# Patient Record
Sex: FEMALE | Race: WHITE | NOT HISPANIC OR LATINO | URBAN - METROPOLITAN AREA
[De-identification: names, ages, dates, MRNs, and addresses within clinical notes are randomized per-mention and may not be internally consistent; named-entity substitution may affect disease eponyms.]

---

## 2023-05-12 LAB — GROUP A STREP, PCR: NOT DETECTED

## 2023-09-01 PROBLEM — F41.9 ANXIETY: Status: ACTIVE | Noted: 2023-09-01

## 2023-09-01 PROBLEM — R92.1 BREAST CALCIFICATION, LEFT: Status: ACTIVE | Noted: 2023-09-01

## 2023-09-01 PROBLEM — K21.9 GERD (GASTROESOPHAGEAL REFLUX DISEASE): Status: ACTIVE | Noted: 2023-09-01

## 2023-09-01 PROBLEM — F51.01 PRIMARY INSOMNIA: Status: ACTIVE | Noted: 2023-09-01

## 2023-09-01 RX ORDER — MULTIVITAMIN
1 TABLET ORAL DAILY
COMMUNITY

## 2023-09-01 ASSESSMENT — ENCOUNTER SYMPTOMS
BACK PAIN: 0
EYE REDNESS: 0
EYE PAIN: 0
SHORTNESS OF BREATH: 0
SORE THROAT: 0
BLOOD IN STOOL: 0
NECK PAIN: 0
DIZZINESS: 0
DYSURIA: 0
HEADACHES: 0
ADENOPATHY: 0
CHILLS: 0
UNEXPECTED WEIGHT CHANGE: 0
ABDOMINAL PAIN: 0
APPETITE CHANGE: 0
HALLUCINATIONS: 0
VOMITING: 0
EYE DISCHARGE: 0
CONFUSION: 0
BRUISES/BLEEDS EASILY: 0
FEVER: 0
TROUBLE SWALLOWING: 0
PALPITATIONS: 0
COUGH: 0
POLYPHAGIA: 0
FREQUENCY: 0
FATIGUE: 0
POLYDIPSIA: 0
WOUND: 0
HEMATURIA: 0

## 2023-09-01 NOTE — PROGRESS NOTES
Subjective   Patient ID: Kaylah Parham is a 43 y.o. female who presents for No chief complaint on file..  Is pt fasting? ***  Does pt see any providers other than care team below: ***  staci Vergara, paige to [facility]    Does pt want flu shot?  When did dr to say your next mammogram is due?    No care team member to display    HPI  Last labs-11/4/22 cmp nl, ldl 126, tsh nl, cbc nl  Due for labs- all    Cholesterol   Date Value Ref Range Status   11/11/2021 163 0 - 199 mg/dL Final     Comment:     .      AGE      DESIRABLE   BORDERLINE HIGH   HIGH     0-19 Y     0 - 169       170 - 199     >/= 200    20-24 Y     0 - 189       190 - 224     >/= 225         >24 Y     0 - 199       200 - 239     >/= 240   **All ranges are based on fasting samples. Specific   therapeutic targets will vary based on patient-specific   cardiac risk.  .   Pediatric guidelines reference:Pediatrics 2011, 128(S5).   Adult guidelines reference: NCEP ATPIII Guidelines,     SAAMNTHA 2001, 258:2486-97  .   Venipuncture immediately after or during the    administration of Metamizole may lead to falsely   low results. Testing should be performed immediately   prior to Metamizole dosing.       Triglycerides   Date Value Ref Range Status   11/11/2021 46 0 - 149 mg/dL Final     Comment:     .      AGE      DESIRABLE   BORDERLINE HIGH   HIGH     VERY HIGH   0 D-90 D    19 - 174         ----         ----        ----  91 D- 9 Y     0 -  74        75 -  99     >/= 100      ----    10-19 Y     0 -  89        90 - 129     >/= 130      ----    20-24 Y     0 - 114       115 - 149     >/= 150      ----         >24 Y     0 - 149       150 - 199    200- 499    >/= 500  .   Venipuncture immediately after or during the    administration of Metamizole may lead to falsely   low results. Testing should be performed immediately   prior to Metamizole dosing.       HDL   Date Value Ref Range Status   11/11/2021 50.9 mg/dL Final     Comment:     .      AGE      VERY  "LOW   LOW     NORMAL    HIGH       0-19 Y       < 35   < 40     40-45     ----    20-24 Y       ----   < 40       >45     ----      >24 Y       ----   < 40     40-60      >60  .       No results found for: \"LDL\"  TSH   Date Value Ref Range Status   11/11/2021 1.54 0.44 - 3.98 mIU/L Final     Comment:      TSH testing is performed using different testing    methodology at [facility] than at other    [facility] hospitals. Direct result comparisons should    only be made within the same method.       No results found for: \"A1C\"  No components found for: \"POCA1C\"  No results found for: \"ALBUR\"  No components found for: \"POCALBUR\"      Other concerns:    bps at home- ***    ER/urgicare visits in the last year- ***  Hospitalizations in the last year- ***      last Pap-3/4/23; due 3/2028  H/o abn pap-     ovarian, endometrial, cervical, uterine ca-    Current birth control method-  No change in contraception desired    last mammo- (40-75/90) 11/9/22; next one?  Self breast exams-    FH br ca-    FH colon ca-      Exercise- ***  Diet-***   There is no height or weight on file to calculate BMI.    last eye dr appt- 3/21/23  No vision issues    last dental appt- ***    BMs-regular  Sleep-able to fall asleep and stay asleep; no snoring or apnea  no cp, swelling, sob, abd pain, n/v/d/c, blood in stool or black stools  STI testing including hiv (age 15-65) and hep c screening (18-79)-***        Immunization History   Administered Date(s) Administered   • Tdap vaccine, age 10 years and older (BOOSTRIX) 01/02/2018       fractures in lifetime-***  FH hip/spine/wrist/humerus fx in mom, dad or sibs-    FH heart attack, heart surgery-mat gr gm-mi, mat gr gf-mi  FH stroke-***    The 10-year ASCVD risk score (Ines ANDREWS, et al., 2019) is: 0.5%    Values used to calculate the score:      Age: 43 years      Sex: Female      Is Non- : No      Diabetic: No      Tobacco smoker: No      Systolic Blood " Pressure: 118 mmHg      Is BP treated: No      HDL Cholesterol: 50.9 mg/dL      Total Cholesterol: 163 mg/dL        Review of Systems   Constitutional:  Negative for appetite change, chills, fatigue, fever and unexpected weight change.   HENT:  Negative for congestion, ear pain, sore throat and trouble swallowing.    Eyes:  Negative for pain, discharge and redness.   Respiratory:  Negative for cough and shortness of breath.    Cardiovascular:  Negative for chest pain and palpitations.   Gastrointestinal:  Negative for abdominal pain, blood in stool and vomiting.   Endocrine: Negative for polydipsia, polyphagia and polyuria.   Genitourinary:  Negative for dysuria, frequency, hematuria and urgency.   Musculoskeletal:  Negative for back pain and neck pain.   Skin:  Negative for rash and wound.   Allergic/Immunologic: Negative for immunocompromised state.   Neurological:  Negative for dizziness, syncope and headaches.   Hematological:  Negative for adenopathy. Does not bruise/bleed easily.   Psychiatric/Behavioral:  Negative for confusion and hallucinations.      Objective   There were no vitals taken for this visit.   BP Readings from Last 3 Encounters:   05/11/23 118/73   03/04/23 120/81   02/15/23 110/74     Wt Readings from Last 3 Encounters:   05/11/23 54.4 kg (120 lb)   03/04/23 54.9 kg (121 lb)   02/15/23 54.4 kg (120 lb)           Physical Exam  Constitutional:       General: She is not in acute distress.     Appearance: Normal appearance. She is not ill-appearing.   HENT:      Head: Normocephalic.      Right Ear: Tympanic membrane, ear canal and external ear normal.      Left Ear: Tympanic membrane, ear canal and external ear normal.      Nose: Nose normal.      Mouth/Throat:      Mouth: Mucous membranes are moist.      Pharynx: Oropharynx is clear.   Eyes:      Extraocular Movements: Extraocular movements intact.      Conjunctiva/sclera: Conjunctivae normal.      Pupils: Pupils are equal, round, and reactive  to light.   Cardiovascular:      Rate and Rhythm: Normal rate and regular rhythm.      Heart sounds: Normal heart sounds. No murmur heard.  Pulmonary:      Effort: Pulmonary effort is normal. No respiratory distress.      Breath sounds: Normal breath sounds. No wheezing, rhonchi or rales.   Abdominal:      General: Bowel sounds are normal.      Palpations: Abdomen is soft. There is no mass.      Tenderness: There is no abdominal tenderness.   Musculoskeletal:         General: No swelling or tenderness. Normal range of motion.      Cervical back: Normal range of motion and neck supple.      Right lower leg: No edema.      Left lower leg: No edema.   Skin:     General: Skin is warm.      Findings: No rash.   Neurological:      General: No focal deficit present.      Mental Status: She is alert and oriented to person, place, and time.      Cranial Nerves: No cranial nerve deficit.      Motor: No weakness.   Psychiatric:         Mood and Affect: Mood normal.         Behavior: Behavior normal.     Assessment/Plan   {Assess/PlanSmartLinks:49232}

## 2023-09-05 ENCOUNTER — APPOINTMENT (OUTPATIENT)
Dept: PRIMARY CARE | Facility: CLINIC | Age: 43
End: 2023-09-05
Payer: COMMERCIAL

## 2023-09-22 ASSESSMENT — ENCOUNTER SYMPTOMS
EYE DISCHARGE: 0
ADENOPATHY: 0
FATIGUE: 0
CHILLS: 0
EYE PAIN: 0
EYE REDNESS: 0
VOMITING: 0
APPETITE CHANGE: 0
BLOOD IN STOOL: 0
DIZZINESS: 0
WOUND: 0
DYSURIA: 0
HEADACHES: 0
COUGH: 0
NECK PAIN: 0
FEVER: 0
CONFUSION: 0
HEMATURIA: 0
POLYDIPSIA: 0
SORE THROAT: 0
PALPITATIONS: 0
TROUBLE SWALLOWING: 0
HALLUCINATIONS: 0
UNEXPECTED WEIGHT CHANGE: 0
POLYPHAGIA: 0
BRUISES/BLEEDS EASILY: 0
BACK PAIN: 0
SHORTNESS OF BREATH: 0
ABDOMINAL PAIN: 0
FREQUENCY: 0

## 2023-09-22 NOTE — PROGRESS NOTES
Subjective   Patient ID: Kaylah Parham is a 43 y.o. female who presents for Annual Exam.  Is pt fasting? Yes  Does pt see any providers other than care team below: No  staci Vergara, yousuf, karley gibson    Does pt want flu shot? yes  When did dr to say your next mammogram is due? November 2023    No care team member to display    HPI  Last labs-11/4/22 cmp nl, ldl 126, tsh nl, cbc nl  Due for labs- all    Cholesterol   Date Value Ref Range Status   11/11/2021 163 0 - 199 mg/dL Final     Comment:     .      AGE      DESIRABLE   BORDERLINE HIGH   HIGH     0-19 Y     0 - 169       170 - 199     >/= 200    20-24 Y     0 - 189       190 - 224     >/= 225         >24 Y     0 - 199       200 - 239     >/= 240   **All ranges are based on fasting samples. Specific   therapeutic targets will vary based on patient-specific   cardiac risk.  .   Pediatric guidelines reference:Pediatrics 2011, 128(S5).   Adult guidelines reference: NCEP ATPIII Guidelines,     SAMANTHA 2001, 258:2486-97  .   Venipuncture immediately after or during the    administration of Metamizole may lead to falsely   low results. Testing should be performed immediately   prior to Metamizole dosing.       Triglycerides   Date Value Ref Range Status   11/11/2021 46 0 - 149 mg/dL Final     Comment:     .      AGE      DESIRABLE   BORDERLINE HIGH   HIGH     VERY HIGH   0 D-90 D    19 - 174         ----         ----        ----  91 D- 9 Y     0 -  74        75 -  99     >/= 100      ----    10-19 Y     0 -  89        90 - 129     >/= 130      ----    20-24 Y     0 - 114       115 - 149     >/= 150      ----         >24 Y     0 - 149       150 - 199    200- 499    >/= 500  .   Venipuncture immediately after or during the    administration of Metamizole may lead to falsely   low results. Testing should be performed immediately   prior to Metamizole dosing.       HDL   Date Value Ref Range Status   11/11/2021 50.9 mg/dL Final     Comment:     .      AGE      VERY  "LOW   LOW     NORMAL    HIGH       0-19 Y       < 35   < 40     40-45     ----    20-24 Y       ----   < 40       >45     ----      >24 Y       ----   < 40     40-60      >60  .       No results found for: \"LDL\"  TSH   Date Value Ref Range Status   11/11/2021 1.54 0.44 - 3.98 mIU/L Final     Comment:      TSH testing is performed using different testing    methodology at [facility] than at other    [facility] hospitals. Direct result comparisons should    only be made within the same method.       No results found for: \"A1C\"  No components found for: \"POCA1C\"  No results found for: \"ALBUR\"  No components found for: \"POCALBUR\"      Other concerns: pain issues since may when gardening, mulching, weeding  Thumb swollen, hand painful/numbness-gone now  Rt thumb still numb on off    rt arm pain x 2mon on off; worse end of day    Lf neck pain x x5d    Bili always high    bps at home- none    ER/urgicare visits in the last year- ST  Hospitalizations in the last year- none      last Pap-3/4/23; due 3/2028  H/o abn pap-2 times ago; hpv was normal; last pap both normal    FH ovarian, endometrial, cervical, uterine ca-none    Current birth control method-none  No change in contraception desired    last mammo- (40-75/90) 11/9/22; next one? Due 11/2023      FH br ca-none    FH colon ca-mat gr gm    Exercise- son in 1/2 day ; wants to swim  Diet-2 meals   Body mass index is 21.28 kg/m².    last eye dr appt- 3/21/23  No vision issues    last dental appt- q6mon    BMs-regular with occas constipation  Sleep-able to fall asleep but hard to stay asleep; no snoring or apnea 6-7 hrs  no cp, swelling, sob, abd pain, n/v/d/c, blood in stool or black stools  STI testing including hiv (age 15-65) and hep c screening (18-79)-declines        Immunization History   Administered Date(s) Administered    Moderna SARS-CoV-2 Vaccination 04/21/2021, 05/21/2021, 12/14/2021    Tdap vaccine, age 7 year and older (BOOSTRIX) " 01/02/2018       fractures in lifetime-none    FH heart attack, heart surgery-mat gr gm-mi, mat gr gf-mi; mat gm-valve issues, afib, mi  FH stroke-none    The 10-year ASCVD risk score (Ines ANDREWS, et al., 2019) is: 0.5%    Values used to calculate the score:      Age: 43 years      Sex: Female      Is Non- : No      Diabetic: No      Tobacco smoker: No      Systolic Blood Pressure: 115 mmHg      Is BP treated: No      HDL Cholesterol: 50.9 mg/dL      Total Cholesterol: 163 mg/dL        Review of Systems   Constitutional:  Negative for appetite change, chills, fatigue, fever and unexpected weight change.   HENT:  Negative for congestion, ear pain, sore throat and trouble swallowing.    Eyes:  Negative for pain, discharge and redness.   Respiratory:  Negative for cough and shortness of breath.    Cardiovascular:  Negative for chest pain and palpitations.   Gastrointestinal:  Negative for abdominal pain, blood in stool and vomiting.   Endocrine: Negative for polydipsia, polyphagia and polyuria.   Genitourinary:  Negative for dysuria, frequency, hematuria and urgency.   Musculoskeletal:  Negative for back pain and neck pain.   Skin:  Negative for rash and wound.   Allergic/Immunologic: Negative for immunocompromised state.   Neurological:  Negative for dizziness, syncope and headaches.   Hematological:  Negative for adenopathy. Does not bruise/bleed easily.   Psychiatric/Behavioral:  Negative for confusion and hallucinations.        Objective   Visit Vitals  /69   Pulse 87      BP Readings from Last 3 Encounters:   09/25/23 115/69   05/11/23 118/73   03/04/23 120/81     Wt Readings from Last 3 Encounters:   09/25/23 56.2 kg (124 lb)   05/11/23 54.4 kg (120 lb)   03/04/23 54.9 kg (121 lb)           Physical Exam  Constitutional:       General: She is not in acute distress.     Appearance: Normal appearance. She is not ill-appearing.   HENT:      Head: Normocephalic.      Right Ear: Tympanic  membrane, ear canal and external ear normal.      Left Ear: Tympanic membrane, ear canal and external ear normal.      Nose: Nose normal.      Mouth/Throat:      Mouth: Mucous membranes are moist.      Pharynx: Oropharynx is clear.   Eyes:      Extraocular Movements: Extraocular movements intact.      Conjunctiva/sclera: Conjunctivae normal.      Pupils: Pupils are equal, round, and reactive to light.   Cardiovascular:      Rate and Rhythm: Normal rate and regular rhythm.      Heart sounds: Normal heart sounds. No murmur heard.  Pulmonary:      Effort: Pulmonary effort is normal. No respiratory distress.      Breath sounds: Normal breath sounds. No wheezing, rhonchi or rales.   Abdominal:      General: Bowel sounds are normal.      Palpations: Abdomen is soft. There is no mass.      Tenderness: There is no abdominal tenderness.   Musculoskeletal:         General: No swelling or tenderness. Normal range of motion.      Cervical back: Normal range of motion and neck supple.      Right lower leg: No edema.      Left lower leg: No edema.   Skin:     General: Skin is warm.      Findings: No rash.   Neurological:      General: No focal deficit present.      Mental Status: She is alert and oriented to person, place, and time.      Cranial Nerves: No cranial nerve deficit.      Motor: No weakness.   Psychiatric:         Mood and Affect: Mood normal.         Behavior: Behavior normal.       Assessment/Plan   Diagnoses and all orders for this visit:  Routine general medical examination at a health care facility  -     Comprehensive Metabolic Panel; Future  -     CBC and Auto Differential; Future  -     Lipid Panel; Future  -     TSH with reflex to Free T4 if abnormal; Future  BMI 21.0-21.9, adult  Myalgia  -     Creatine Kinase; Future  Arthralgia, unspecified joint  -     Rheumatoid Factor; Future  -     Sedimentation Rate; Future  -     VERO with Reflex to CASSANDRA; Future  Numbness and tingling of right thumb  -     predniSONE  (Deltasone) 20 mg tablet; 3 tabs daily x3d then 2 tabs daily x 3d then 1 tab daily x 3d then 1/2 tab daily x 3d with food  -     orphenadrine (Norflex) 100 mg 12 hr tablet; Take 1 tablet (100 mg) by mouth 2 times a day as needed for muscle spasms (or back pain). Do not crush, chew, or split.  Acute pain of right shoulder  -     predniSONE (Deltasone) 20 mg tablet; 3 tabs daily x3d then 2 tabs daily x 3d then 1 tab daily x 3d then 1/2 tab daily x 3d with food  -     orphenadrine (Norflex) 100 mg 12 hr tablet; Take 1 tablet (100 mg) by mouth 2 times a day as needed for muscle spasms (or back pain). Do not crush, chew, or split.  Neck pain  -     predniSONE (Deltasone) 20 mg tablet; 3 tabs daily x3d then 2 tabs daily x 3d then 1 tab daily x 3d then 1/2 tab daily x 3d with food  -     orphenadrine (Norflex) 100 mg 12 hr tablet; Take 1 tablet (100 mg) by mouth 2 times a day as needed for muscle spasms (or back pain). Do not crush, chew, or split.  Other orders  -     Flu vaccine (IIV4) age 6 months and greater, preservative free  -     Follow Up In Primary Care - Health Maintenance; Future

## 2023-09-25 ENCOUNTER — OFFICE VISIT (OUTPATIENT)
Dept: PRIMARY CARE | Facility: CLINIC | Age: 43
End: 2023-09-25
Payer: COMMERCIAL

## 2023-09-25 ENCOUNTER — LAB (OUTPATIENT)
Dept: LAB | Facility: LAB | Age: 43
End: 2023-09-25
Payer: COMMERCIAL

## 2023-09-25 ENCOUNTER — TELEPHONE (OUTPATIENT)
Dept: PRIMARY CARE | Facility: CLINIC | Age: 43
End: 2023-09-25

## 2023-09-25 VITALS
WEIGHT: 124 LBS | BODY MASS INDEX: 21.17 KG/M2 | SYSTOLIC BLOOD PRESSURE: 115 MMHG | HEIGHT: 64 IN | OXYGEN SATURATION: 97 % | DIASTOLIC BLOOD PRESSURE: 69 MMHG | HEART RATE: 87 BPM

## 2023-09-25 DIAGNOSIS — M54.2 NECK PAIN: ICD-10-CM

## 2023-09-25 DIAGNOSIS — M25.50 ARTHRALGIA, UNSPECIFIED JOINT: ICD-10-CM

## 2023-09-25 DIAGNOSIS — Z00.00 ROUTINE GENERAL MEDICAL EXAMINATION AT A HEALTH CARE FACILITY: Primary | ICD-10-CM

## 2023-09-25 DIAGNOSIS — R20.2 NUMBNESS AND TINGLING OF RIGHT THUMB: ICD-10-CM

## 2023-09-25 DIAGNOSIS — M79.10 MYALGIA: ICD-10-CM

## 2023-09-25 DIAGNOSIS — Z00.00 ROUTINE GENERAL MEDICAL EXAMINATION AT A HEALTH CARE FACILITY: ICD-10-CM

## 2023-09-25 DIAGNOSIS — M25.511 ACUTE PAIN OF RIGHT SHOULDER: ICD-10-CM

## 2023-09-25 DIAGNOSIS — R92.1 BREAST CALCIFICATION, LEFT: ICD-10-CM

## 2023-09-25 DIAGNOSIS — R20.0 NUMBNESS AND TINGLING OF RIGHT THUMB: ICD-10-CM

## 2023-09-25 LAB
ALANINE AMINOTRANSFERASE (SGPT) (U/L) IN SER/PLAS: 11 U/L (ref 7–45)
ALBUMIN (G/DL) IN SER/PLAS: 4.4 G/DL (ref 3.4–5)
ALKALINE PHOSPHATASE (U/L) IN SER/PLAS: 52 U/L (ref 33–110)
ANION GAP IN SER/PLAS: 12 MMOL/L (ref 10–20)
ASPARTATE AMINOTRANSFERASE (SGOT) (U/L) IN SER/PLAS: 13 U/L (ref 9–39)
BASOPHILS (10*3/UL) IN BLOOD BY AUTOMATED COUNT: 0.05 X10E9/L (ref 0–0.1)
BASOPHILS/100 LEUKOCYTES IN BLOOD BY AUTOMATED COUNT: 0.7 % (ref 0–2)
BILIRUBIN TOTAL (MG/DL) IN SER/PLAS: 2 MG/DL (ref 0–1.2)
CALCIUM (MG/DL) IN SER/PLAS: 9.3 MG/DL (ref 8.6–10.6)
CARBON DIOXIDE, TOTAL (MMOL/L) IN SER/PLAS: 28 MMOL/L (ref 21–32)
CHLORIDE (MMOL/L) IN SER/PLAS: 103 MMOL/L (ref 98–107)
CHOLESTEROL (MG/DL) IN SER/PLAS: 164 MG/DL (ref 0–199)
CHOLESTEROL IN HDL (MG/DL) IN SER/PLAS: 54.2 MG/DL
CHOLESTEROL/HDL RATIO: 3
CREATINE KINASE (U/L) IN SER/PLAS: 47 U/L (ref 0–215)
CREATININE (MG/DL) IN SER/PLAS: 0.82 MG/DL (ref 0.5–1.05)
EOSINOPHILS (10*3/UL) IN BLOOD BY AUTOMATED COUNT: 0.29 X10E9/L (ref 0–0.7)
EOSINOPHILS/100 LEUKOCYTES IN BLOOD BY AUTOMATED COUNT: 4.2 % (ref 0–6)
ERYTHROCYTE DISTRIBUTION WIDTH (RATIO) BY AUTOMATED COUNT: 12 % (ref 11.5–14.5)
ERYTHROCYTE MEAN CORPUSCULAR HEMOGLOBIN CONCENTRATION (G/DL) BY AUTOMATED: 33.2 G/DL (ref 32–36)
ERYTHROCYTE MEAN CORPUSCULAR VOLUME (FL) BY AUTOMATED COUNT: 90 FL (ref 80–100)
ERYTHROCYTES (10*6/UL) IN BLOOD BY AUTOMATED COUNT: 4.24 X10E12/L (ref 4–5.2)
GFR FEMALE: 90 ML/MIN/1.73M2
GLUCOSE (MG/DL) IN SER/PLAS: 89 MG/DL (ref 74–99)
HEMATOCRIT (%) IN BLOOD BY AUTOMATED COUNT: 38.3 % (ref 36–46)
HEMOGLOBIN (G/DL) IN BLOOD: 12.7 G/DL (ref 12–16)
IMMATURE GRANULOCYTES/100 LEUKOCYTES IN BLOOD BY AUTOMATED COUNT: 0.4 % (ref 0–0.9)
LDL: 91 MG/DL (ref 0–99)
LEUKOCYTES (10*3/UL) IN BLOOD BY AUTOMATED COUNT: 6.9 X10E9/L (ref 4.4–11.3)
LYMPHOCYTES (10*3/UL) IN BLOOD BY AUTOMATED COUNT: 1.29 X10E9/L (ref 1.2–4.8)
LYMPHOCYTES/100 LEUKOCYTES IN BLOOD BY AUTOMATED COUNT: 18.8 % (ref 13–44)
MONOCYTES (10*3/UL) IN BLOOD BY AUTOMATED COUNT: 0.56 X10E9/L (ref 0.1–1)
MONOCYTES/100 LEUKOCYTES IN BLOOD BY AUTOMATED COUNT: 8.2 % (ref 2–10)
NEUTROPHILS (10*3/UL) IN BLOOD BY AUTOMATED COUNT: 4.65 X10E9/L (ref 1.2–7.7)
NEUTROPHILS/100 LEUKOCYTES IN BLOOD BY AUTOMATED COUNT: 67.7 % (ref 40–80)
NRBC (PER 100 WBCS) BY AUTOMATED COUNT: 0 /100 WBC (ref 0–0)
PLATELETS (10*3/UL) IN BLOOD AUTOMATED COUNT: 260 X10E9/L (ref 150–450)
POTASSIUM (MMOL/L) IN SER/PLAS: 4.1 MMOL/L (ref 3.5–5.3)
PROTEIN TOTAL: 6.5 G/DL (ref 6.4–8.2)
RHEUMATOID FACTOR (IU/ML) IN SERUM OR PLASMA: <10 IU/ML (ref 0–15)
SEDIMENTATION RATE, ERYTHROCYTE: 2 MM/H (ref 0–20)
SODIUM (MMOL/L) IN SER/PLAS: 139 MMOL/L (ref 136–145)
THYROTROPIN (MIU/L) IN SER/PLAS BY DETECTION LIMIT <= 0.05 MIU/L: 2.55 MIU/L (ref 0.44–3.98)
TRIGLYCERIDE (MG/DL) IN SER/PLAS: 95 MG/DL (ref 0–149)
UREA NITROGEN (MG/DL) IN SER/PLAS: 13 MG/DL (ref 6–23)
VLDL: 19 MG/DL (ref 0–40)

## 2023-09-25 PROCEDURE — 80061 LIPID PANEL: CPT

## 2023-09-25 PROCEDURE — 82550 ASSAY OF CK (CPK): CPT

## 2023-09-25 PROCEDURE — 90686 IIV4 VACC NO PRSV 0.5 ML IM: CPT | Performed by: NURSE PRACTITIONER

## 2023-09-25 PROCEDURE — 1036F TOBACCO NON-USER: CPT | Performed by: NURSE PRACTITIONER

## 2023-09-25 PROCEDURE — 80053 COMPREHEN METABOLIC PANEL: CPT

## 2023-09-25 PROCEDURE — 85025 COMPLETE CBC W/AUTO DIFF WBC: CPT

## 2023-09-25 PROCEDURE — 3008F BODY MASS INDEX DOCD: CPT | Performed by: NURSE PRACTITIONER

## 2023-09-25 PROCEDURE — 90471 IMMUNIZATION ADMIN: CPT | Performed by: NURSE PRACTITIONER

## 2023-09-25 PROCEDURE — 86431 RHEUMATOID FACTOR QUANT: CPT

## 2023-09-25 PROCEDURE — 99396 PREV VISIT EST AGE 40-64: CPT | Performed by: NURSE PRACTITIONER

## 2023-09-25 PROCEDURE — 84443 ASSAY THYROID STIM HORMONE: CPT

## 2023-09-25 PROCEDURE — 86038 ANTINUCLEAR ANTIBODIES: CPT

## 2023-09-25 PROCEDURE — 85652 RBC SED RATE AUTOMATED: CPT

## 2023-09-25 PROCEDURE — 36415 COLL VENOUS BLD VENIPUNCTURE: CPT

## 2023-09-25 RX ORDER — ORPHENADRINE CITRATE 100 MG/1
100 TABLET, EXTENDED RELEASE ORAL 2 TIMES DAILY PRN
Qty: 60 TABLET | Refills: 5 | Status: SHIPPED | OUTPATIENT
Start: 2023-09-25 | End: 2024-01-03 | Stop reason: ALTCHOICE

## 2023-09-25 RX ORDER — PREDNISONE 20 MG/1
TABLET ORAL
Qty: 20 TABLET | Refills: 0 | Status: SHIPPED | OUTPATIENT
Start: 2023-09-25 | End: 2024-01-03 | Stop reason: ALTCHOICE

## 2023-09-25 NOTE — TELEPHONE ENCOUNTER
Pls call dr to's office to obtain last mammo result from may.  I also need to know if I am ordering a bilat screening mammogram this time or bilat diag mammo.

## 2023-09-25 NOTE — PATIENT INSTRUCTIONS
Labs for inflammation  Prednisone with food  No advil, motrin, ibuprofen, aleve, naproxen or other anti-inflammatories while on prednisone.  Tylenol (acetaminophen) is OK to take.   Muscle relaxant  Neck and shoulder stretches  Next: physical therapy    Valerian root-help stay asleep        Handouts given to pt:  physical handout      Labs:    You can use the lab in our building when fasting. The hrs are: Monday-Friday, 7 a.m. - 5 p.m., Saturday 8 a.m. - 12 noon.   No appt needed, BUT YOU DO NEED THE PAPER ORDER.    Fasting is no food, drink, gum or mints other than water for 12 hrs.   Results will be back in 2-3 business days for most labs. It is always recommended for any orders (labs, xrays, ultrasounds,MRI, ct scan, procedures etc) to check with your insurance provider for expected costs or expenses to you.       You will get your results via phone from my medical assistant if you do not have MyChart.  OR  You will get your results via [employer]    If a result is urgent, I will call to speak to you.    Vaccines:  ---- flu vaccine today      General recommendations:  Exercise-cardio 4-5d/wk 30min each day  Diet-Breakfast-toast (my favorite Gini Meng Delighful Multigrain or Srinivas's Killer Bread Good Seed thin-sliced)/bagel/English muffin-whole wheat flour as a 1st ingredient or cereal/oatmeal/granola bar-fiber 4g or more or protein like eggs or peanut butter; optional veggies  Lunch-protein, 1/2c carb or 2 slices bread, veg 1c  Dinner-protein, fist sized carb, veg 1c  Fruit 2 a day  Dairy 2 a day-milk, soy milk, almond milk, cheese, yogurt, cottage cheese  Snacks-Protein-hard boiled egg, nuts (walnuts/almonds/pecans/pistachios 1/4c), hummus, beef/deer jerky or meat sticks; vegetable, fruit, dairy-milk(1%, skim, almond, soy)/cheese (not a lot of cheddar)/yogurt (Greek is best-my favorite Dannon Fruit on the Bottom Greek)/cottage cheese 2%; triscuits/ popcorn/wheat thins have a lot of fiber; follow serving size on  bag/box/container  increase water  Limit alcohol to 1 drink per day for women and 2 drinks per day for men (1 drink=12oz beer or 5oz wine or 1 1/2oz liquor)  Calcium: 500mg 1 twice a day if age 50 and younger and 600mg 1 twice a day if over age 50 (calcium citrate can be taken without food)  Vitamin D: 800-5000 IU/day  Limit salt to <2300mg a day if age 50 and under and <1500mg a day if over age 50/have high bp or diabetes or kidney disease  Recommend folate for childbearing age women 0.4mg per day (can be found in a multivitamin)  Recommend 18mg/dL of iron a day if age 50 and under and 8mg/dL a day if over age 50; take on an empty stomach at bedtime  Use sunscreen   Wear seatbelt  Recommend safe sex practices: using condoms everytime you have sex, discuss with a new partner about their past partners/history of STDs/drug use, avoid drinking alcohol or using drugs as this increases the chance that you will participate in high-risk sex, for oral sex help protect your mouth by having your partner use a condom (male or female), women should not douche after sex, be aware of your partner's body and your body-look for signs of a sore, blister, rash, or discharge, and have regular exams and periodic tests for STDs.  No distracted driving  No driving when under influence of substances  Wear a seatbelt  Eye dr every 1-2yrs  Dentist every 6-12 mon  Tetanus shot every 10yrs  Recommend flu vaccine in the fall  Appt in  1 year for physical      I will communicate with you via [employer] regarding messages and results. If you need help with this, you can call the support line at [phone].    IT WAS A PLEASURE TO SEE YOU TODAY. THANK YOU FOR CHOOSING US FOR YOUR HEALTHCARE NEEDS.

## 2023-09-26 LAB — ANTI-NUCLEAR ANTIBODY (ANA): NEGATIVE

## 2023-10-02 ENCOUNTER — APPOINTMENT (OUTPATIENT)
Dept: PRIMARY CARE | Facility: CLINIC | Age: 43
End: 2023-10-02
Payer: COMMERCIAL

## 2023-12-10 ASSESSMENT — ENCOUNTER SYMPTOMS
CONSTITUTIONAL NEGATIVE: 1
WHEEZING: 0
SHORTNESS OF BREATH: 0

## 2023-12-10 NOTE — PROGRESS NOTES
Subjective   Patient ID: Kaylah Parham is a 43 y.o. female who presents for No chief complaint on file..  Last physical: 9/25/23  Last labs-9/2023 cmp nl, lipid nl, tsh nl, cbc nl, inflammatory labs nl    Did pt do bilat diag mammo?  Any questions or concerns that pt wants to discuss today?      HPI      No care team member to display     Review of Systems   Constitutional: Negative.    Respiratory:  Negative for shortness of breath and wheezing.    Cardiovascular:  Negative for chest pain.     Objective   There were no vitals taken for this visit.   BP Readings from Last 3 Encounters:   09/25/23 115/69   05/11/23 118/73   03/04/23 120/81     Wt Readings from Last 3 Encounters:   09/25/23 56.2 kg (124 lb)   05/11/23 54.4 kg (120 lb)   03/04/23 54.9 kg (121 lb)       Physical Exam  Constitutional:       General: She is not in acute distress.     Appearance: Normal appearance.   Neurological:      Mental Status: She is alert.     Assessment/Plan   {Assess/PlanSmartLinks:42398}

## 2023-12-11 ENCOUNTER — APPOINTMENT (OUTPATIENT)
Dept: PRIMARY CARE | Facility: CLINIC | Age: 43
End: 2023-12-11

## 2024-01-03 ENCOUNTER — OFFICE VISIT (OUTPATIENT)
Dept: PRIMARY CARE | Facility: CLINIC | Age: 44
End: 2024-01-03
Payer: COMMERCIAL

## 2024-01-03 VITALS
DIASTOLIC BLOOD PRESSURE: 70 MMHG | BODY MASS INDEX: 21.21 KG/M2 | SYSTOLIC BLOOD PRESSURE: 108 MMHG | HEART RATE: 83 BPM | TEMPERATURE: 98.3 F | WEIGHT: 124.2 LBS | OXYGEN SATURATION: 98 % | HEIGHT: 64 IN

## 2024-01-03 DIAGNOSIS — R22.1 LUMP IN NECK: Primary | ICD-10-CM

## 2024-01-03 PROCEDURE — 3008F BODY MASS INDEX DOCD: CPT | Performed by: NURSE PRACTITIONER

## 2024-01-03 PROCEDURE — 99213 OFFICE O/P EST LOW 20 MIN: CPT | Performed by: NURSE PRACTITIONER

## 2024-01-03 PROCEDURE — 1036F TOBACCO NON-USER: CPT | Performed by: NURSE PRACTITIONER

## 2024-01-03 ASSESSMENT — PATIENT HEALTH QUESTIONNAIRE - PHQ9
1. LITTLE INTEREST OR PLEASURE IN DOING THINGS: NOT AT ALL
2. FEELING DOWN, DEPRESSED OR HOPELESS: NOT AT ALL
SUM OF ALL RESPONSES TO PHQ9 QUESTIONS 1 AND 2: 0

## 2024-01-03 ASSESSMENT — ENCOUNTER SYMPTOMS
SORE THROAT: 0
RHINORRHEA: 0
CHILLS: 0
FEVER: 0

## 2024-01-03 NOTE — PROGRESS NOTES
Subjective   Patient ID: Kaylah Parham is a 43 y.o. female who presents for Mass.  Last physical: due      HPI  Lump in neck middle  Noticed a couple weeks ago   No pain noted.no swelling. No redness. No open area or d/c   Change since 1st noticed it-maybe a little bigger  No fever chills or night sweats  Sinus infection in nov x1mon    No care team member to display     Review of Systems   Constitutional:  Negative for chills and fever.   HENT:  Negative for congestion, postnasal drip, rhinorrhea and sore throat.    Skin:         Lump front of neck       Objective   Visit Vitals  /70   Pulse 83   Temp 36.8 °C (98.3 °F)      BP Readings from Last 3 Encounters:   01/03/24 108/70   09/25/23 115/69   05/11/23 118/73     Wt Readings from Last 3 Encounters:   01/03/24 56.3 kg (124 lb 3.2 oz)   09/25/23 56.2 kg (124 lb)   05/11/23 54.4 kg (120 lb)       Physical Exam  Constitutional:       Appearance: Normal appearance.   Neck:      Comments: Lump from of neck noted at thyroid area.  No lymphadenopathy noted  Neurological:      Mental Status: She is alert.         Assessment/Plan   Diagnoses and all orders for this visit:  Lump in neck  -     CBC and Auto Differential; Future  -     US thyroid; Future  Other orders  -     Follow Up In Primary Care - Health Maintenance; Future

## 2024-01-03 NOTE — PATIENT INSTRUCTIONS
[facility] lab  Ultrasound thyroid    Return for wellness appt      I will communicate with you via [employer] regarding messages and results. If you need help with this, you can call the support line at [phone].    IT WAS A PLEASURE TO SEE YOU TODAY. THANK YOU FOR CHOOSING US FOR YOUR HEALTHCARE NEEDS.

## 2024-01-05 ENCOUNTER — HOSPITAL ENCOUNTER (OUTPATIENT)
Dept: RADIOLOGY | Facility: HOSPITAL | Age: 44
Discharge: HOME | End: 2024-01-05
Payer: COMMERCIAL

## 2024-01-05 DIAGNOSIS — R22.1 LUMP IN NECK: ICD-10-CM

## 2024-01-05 LAB
NON-UH HIE BASO COUNT: 0.06 X1000 (ref 0–0.2)
NON-UH HIE BASOS %: 0.9 %
NON-UH HIE DIFF?: NO
NON-UH HIE EOS COUNT: 0.38 X1000 (ref 0–0.5)
NON-UH HIE EOSIN %: 5.8 %
NON-UH HIE HCT: 37.2 % (ref 36–46)
NON-UH HIE HGB: 12.8 G/DL (ref 12–16)
NON-UH HIE INSTR WBC: 6.7
NON-UH HIE LYMPH %: 17.3 %
NON-UH HIE LYMPH COUNT: 1.15 X1000 (ref 1.2–4.8)
NON-UH HIE MCH: 29.8 PG (ref 27–34)
NON-UH HIE MCHC: 34.4 G/DL (ref 32–37)
NON-UH HIE MCV: 86.5 FL (ref 80–100)
NON-UH HIE MONO %: 10.2 %
NON-UH HIE MONO COUNT: 0.68 X1000 (ref 0.1–1)
NON-UH HIE MPV: 9.3 FL (ref 7.4–10.4)
NON-UH HIE NEUTROPHIL %: 66 %
NON-UH HIE NEUTROPHIL COUNT (ANC): 4.39 X1000 (ref 1.4–8.8)
NON-UH HIE NUCLEATED RBC: 0 /100WBC
NON-UH HIE PLATELET: 216 X10 (ref 150–450)
NON-UH HIE RBC: 4.3 X10 (ref 4.2–5.4)
NON-UH HIE RDW: 12.8 % (ref 11.5–14.5)
NON-UH HIE WBC: 6.7 X10 (ref 4.5–11)

## 2024-01-05 PROCEDURE — 76536 US EXAM OF HEAD AND NECK: CPT

## 2024-01-05 PROCEDURE — 76536 US EXAM OF HEAD AND NECK: CPT | Performed by: RADIOLOGY

## 2024-03-03 ASSESSMENT — ENCOUNTER SYMPTOMS: CONSTITUTIONAL NEGATIVE: 1

## 2024-03-03 NOTE — PROGRESS NOTES
Subjective   Patient ID: Kaylah Parham is a 44 y.o. female who presents for Follow-up.  Last physical: 9/25/23    Has pt [facility] since may? [facility]  (Pt sees dr to)  Any other questions or concerns that pt wants to discuss today?  Patient was sick in November and then again a month ago, since then she has been expiercing a cough at night and in morning then on and off chest pain         HPI  Last labs-1/2024 cbc nl  9/2023 mickie nl, rf nl, sed rate, nl, ck nl, tsh nl, cmp nl, cbc nl, lipid nl  11/4/22 cmp nl, ldl 126, tsh nl, cbc nl   Due for labs- none    Cholesterol   Date Value Ref Range Status   09/25/2023 164 0 - 199 mg/dL Final     Comment:     .      AGE      DESIRABLE   BORDERLINE HIGH   HIGH     0-19 Y     0 - 169       170 - 199     >/= 200    20-24 Y     0 - 189       190 - 224     >/= 225         >24 Y     0 - 199       200 - 239     >/= 240   **All ranges are based on fasting samples. Specific   therapeutic targets will vary based on patient-specific   cardiac risk.  .   Pediatric guidelines reference:Pediatrics 2011, 128(S5).   Adult guidelines reference: NCEP ATPIII Guidelines,     SAMANTHA 2001, 258:2486-97  .   Venipuncture immediately after or during the    administration of Metamizole may lead to falsely   low results. Testing should be performed immediately   prior to Metamizole dosing.   11/11/2021 163 0 - 199 mg/dL Final     Comment:     .      AGE      DESIRABLE   BORDERLINE HIGH   HIGH     0-19 Y     0 - 169       170 - 199     >/= 200    20-24 Y     0 - 189       190 - 224     >/= 225         >24 Y     0 - 199       200 - 239     >/= 240   **All ranges are based on fasting samples. Specific   therapeutic targets will vary based on patient-specific   cardiac risk.  .   Pediatric guidelines reference:Pediatrics 2011, 128(S5).   Adult guidelines reference: NCEP ATPIII Guidelines,     SAMANTHA 2001, 258:2486-97  .   Venipuncture immediately after or during the    administration of  Metamizole may lead to falsely   low results. Testing should be performed immediately   prior to Metamizole dosing.       Triglycerides   Date Value Ref Range Status   09/25/2023 95 0 - 149 mg/dL Final     Comment:     .      AGE      DESIRABLE   BORDERLINE HIGH   HIGH     VERY HIGH   0 D-90 D    19 - 174         ----         ----        ----  91 D- 9 Y     0 -  74        75 -  99     >/= 100      ----    10-19 Y     0 -  89        90 - 129     >/= 130      ----    20-24 Y     0 - 114       115 - 149     >/= 150      ----         >24 Y     0 - 149       150 - 199    200- 499    >/= 500  .   Venipuncture immediately after or during the    administration of Metamizole may lead to falsely   low results. Testing should be performed immediately   prior to Metamizole dosing.   11/11/2021 46 0 - 149 mg/dL Final     Comment:     .      AGE      DESIRABLE   BORDERLINE HIGH   HIGH     VERY HIGH   0 D-90 D    19 - 174         ----         ----        ----  91 D- 9 Y     0 -  74        75 -  99     >/= 100      ----    10-19 Y     0 -  89        90 - 129     >/= 130      ----    20-24 Y     0 - 114       115 - 149     >/= 150      ----         >24 Y     0 - 149       150 - 199    200- 499    >/= 500  .   Venipuncture immediately after or during the    administration of Metamizole may lead to falsely   low results. Testing should be performed immediately   prior to Metamizole dosing.       HDL   Date Value Ref Range Status   09/25/2023 54.2 mg/dL Final     Comment:     .      AGE      VERY LOW   LOW     NORMAL    HIGH       0-19 Y       < 35   < 40     40-45     ----    20-24 Y       ----   < 40       >45     ----      >24 Y       ----   < 40     40-60      >60  .   11/11/2021 50.9 mg/dL Final     Comment:     .      AGE      VERY LOW   LOW     NORMAL    HIGH       0-19 Y       < 35   < 40     40-45     ----    20-24 Y       ----   < 40       >45     ----      >24 Y       ----   < 40     40-60      >60  .       No results found  "for: \"LDL\"  TSH   Date Value Ref Range Status   09/25/2023 2.55 0.44 - 3.98 mIU/L Final     Comment:      TSH testing is performed using different testing    methodology at [facility] than at other    [facility] hospitals. Direct result comparisons should    only be made within the same method.     No results found for: \"A1C\"  No components found for: \"POCA1C\"  No results found for: \"ALBUR\"  No components found for: \"POCALBUR\"      Immunization History   Administered Date(s) Administered    Flu vaccine (IIV4), preservative free *Check age/dose* 09/25/2023    Moderna SARS-CoV-2 Vaccination 04/21/2021, 05/21/2021, 12/14/2021    Tdap vaccine, age 7 year and older (BOOSTRIX, ADACEL) 01/02/2018     Patient was sick in November-sinus then in chest and got fully better and then again a month ago-cough, st, HA; ST and HA gone now; cough am and evening and sl productive. Post nasal drip noted. Today maxillary and frontal pain.  [facility]-no rxs given  No sob or wheezing in the last wk  No fever or chills in the last wk  No recent selftxt.    Since nov cp on off; heart racing on off. Fluttery feeling on off; last time was left under breast. Lasts 1-3hrs  No dizziness  No vision change  Pt has a history of anxiety      No care team member to display     Review of Systems   Constitutional: Negative.  Negative for chills and fever.   HENT:  Positive for postnasal drip.    Respiratory:  Positive for cough.    Cardiovascular:  Positive for chest pain.       Objective   Visit Vitals  /68   Pulse 81   Temp 36.7 °C (98 °F)      BP Readings from Last 3 Encounters:   03/04/24 120/68   01/03/24 108/70   09/25/23 115/69     Wt Readings from Last 3 Encounters:   03/04/24 56 kg (123 lb 6.4 oz)   01/03/24 56.3 kg (124 lb 3.2 oz)   09/25/23 56.2 kg (124 lb)       The 10-year ASCVD risk score (Ines ANDREWS, et al., 2019) is: 0.5%    Values used to calculate the score:      Age: 44 years      " Sex: Female      Is Non- : No      Diabetic: No      Tobacco smoker: No      Systolic Blood Pressure: 120 mmHg      Is BP treated: No      HDL Cholesterol: 54.2 mg/dL      Total Cholesterol: 164 mg/dL     Physical Exam  Constitutional:       General: She is not in acute distress.     Appearance: Normal appearance.   HENT:      Head: Normocephalic.      Right Ear: Tympanic membrane, ear canal and external ear normal.      Left Ear: Tympanic membrane, ear canal and external ear normal.      Nose: Nose normal.      Mouth/Throat:      Mouth: Mucous membranes are moist.      Pharynx: No oropharyngeal exudate or posterior oropharyngeal erythema.   Cardiovascular:      Rate and Rhythm: Normal rate and regular rhythm.      Heart sounds: Normal heart sounds. No murmur heard.  Pulmonary:      Effort: Pulmonary effort is normal.      Breath sounds: Normal breath sounds. No wheezing, rhonchi or rales.   Lymphadenopathy:      Cervical: No cervical adenopathy.   Neurological:      Mental Status: She is alert.       Assessment/Plan   Diagnoses and all orders for this visit:  Acute cough  -     XR chest 2 views; Future  Atypical chest pain  -     Comprehensive Metabolic Panel; Future  -     TSH with reflex to Free T4 if abnormal; Future  -     XR chest 2 views; Future  -     Referral to Cardiology; Future  -     ECG 12 Lead  Sinus congestion  -     levoFLOXacin (Levaquin) 500 mg tablet; Take 1 tablet (500 mg) by mouth once daily for 7 days.  Other orders  -     Follow Up In Primary Care - Health Maintenance

## 2024-03-04 ENCOUNTER — OFFICE VISIT (OUTPATIENT)
Dept: PRIMARY CARE | Facility: CLINIC | Age: 44
End: 2024-03-04
Payer: COMMERCIAL

## 2024-03-04 ENCOUNTER — HOSPITAL ENCOUNTER (OUTPATIENT)
Dept: RADIOLOGY | Facility: EXTERNAL LOCATION | Age: 44
Discharge: HOME | End: 2024-03-04

## 2024-03-04 ENCOUNTER — LAB (OUTPATIENT)
Dept: LAB | Facility: LAB | Age: 44
End: 2024-03-04
Payer: COMMERCIAL

## 2024-03-04 VITALS
HEIGHT: 64 IN | SYSTOLIC BLOOD PRESSURE: 120 MMHG | DIASTOLIC BLOOD PRESSURE: 68 MMHG | WEIGHT: 123.4 LBS | BODY MASS INDEX: 21.07 KG/M2 | HEART RATE: 81 BPM | OXYGEN SATURATION: 97 % | TEMPERATURE: 98 F

## 2024-03-04 DIAGNOSIS — R05.1 ACUTE COUGH: Primary | ICD-10-CM

## 2024-03-04 DIAGNOSIS — R05.1 ACUTE COUGH: ICD-10-CM

## 2024-03-04 DIAGNOSIS — R07.89 ATYPICAL CHEST PAIN: ICD-10-CM

## 2024-03-04 DIAGNOSIS — R22.1 LUMP IN NECK: ICD-10-CM

## 2024-03-04 DIAGNOSIS — R09.81 SINUS CONGESTION: ICD-10-CM

## 2024-03-04 PROBLEM — M79.10 MYALGIA: Status: RESOLVED | Noted: 2023-09-25 | Resolved: 2024-03-04

## 2024-03-04 LAB
ALBUMIN SERPL BCP-MCNC: 4.5 G/DL (ref 3.4–5)
ALP SERPL-CCNC: 47 U/L (ref 33–110)
ALT SERPL W P-5'-P-CCNC: 9 U/L (ref 7–45)
ANION GAP SERPL CALC-SCNC: 13 MMOL/L (ref 10–20)
AST SERPL W P-5'-P-CCNC: 13 U/L (ref 9–39)
BASOPHILS # BLD AUTO: 0.06 X10*3/UL (ref 0–0.1)
BASOPHILS NFR BLD AUTO: 0.8 %
BILIRUB SERPL-MCNC: 2.5 MG/DL (ref 0–1.2)
BUN SERPL-MCNC: 13 MG/DL (ref 6–23)
CALCIUM SERPL-MCNC: 9.3 MG/DL (ref 8.6–10.6)
CHLORIDE SERPL-SCNC: 104 MMOL/L (ref 98–107)
CO2 SERPL-SCNC: 26 MMOL/L (ref 21–32)
CREAT SERPL-MCNC: 0.8 MG/DL (ref 0.5–1.05)
EGFRCR SERPLBLD CKD-EPI 2021: >90 ML/MIN/1.73M*2
EOSINOPHIL # BLD AUTO: 0.38 X10*3/UL (ref 0–0.7)
EOSINOPHIL NFR BLD AUTO: 5 %
ERYTHROCYTE [DISTWIDTH] IN BLOOD BY AUTOMATED COUNT: 12.7 % (ref 11.5–14.5)
GLUCOSE SERPL-MCNC: 98 MG/DL (ref 74–99)
HCT VFR BLD AUTO: 40.8 % (ref 36–46)
HGB BLD-MCNC: 13.4 G/DL (ref 12–16)
IMM GRANULOCYTES # BLD AUTO: 0.02 X10*3/UL (ref 0–0.7)
IMM GRANULOCYTES NFR BLD AUTO: 0.3 % (ref 0–0.9)
LYMPHOCYTES # BLD AUTO: 1.33 X10*3/UL (ref 1.2–4.8)
LYMPHOCYTES NFR BLD AUTO: 17.5 %
MCH RBC QN AUTO: 28.6 PG (ref 26–34)
MCHC RBC AUTO-ENTMCNC: 32.8 G/DL (ref 32–36)
MCV RBC AUTO: 87 FL (ref 80–100)
MONOCYTES # BLD AUTO: 0.8 X10*3/UL (ref 0.1–1)
MONOCYTES NFR BLD AUTO: 10.5 %
NEUTROPHILS # BLD AUTO: 5.03 X10*3/UL (ref 1.2–7.7)
NEUTROPHILS NFR BLD AUTO: 65.9 %
NRBC BLD-RTO: 0 /100 WBCS (ref 0–0)
PLATELET # BLD AUTO: 284 X10*3/UL (ref 150–450)
POTASSIUM SERPL-SCNC: 4.5 MMOL/L (ref 3.5–5.3)
PROT SERPL-MCNC: 6.5 G/DL (ref 6.4–8.2)
RBC # BLD AUTO: 4.69 X10*6/UL (ref 4–5.2)
SODIUM SERPL-SCNC: 138 MMOL/L (ref 136–145)
TSH SERPL-ACNC: 2.06 MIU/L (ref 0.44–3.98)
WBC # BLD AUTO: 7.6 X10*3/UL (ref 4.4–11.3)

## 2024-03-04 PROCEDURE — 36415 COLL VENOUS BLD VENIPUNCTURE: CPT

## 2024-03-04 PROCEDURE — 93000 ELECTROCARDIOGRAM COMPLETE: CPT | Performed by: NURSE PRACTITIONER

## 2024-03-04 PROCEDURE — 1036F TOBACCO NON-USER: CPT | Performed by: NURSE PRACTITIONER

## 2024-03-04 PROCEDURE — 84443 ASSAY THYROID STIM HORMONE: CPT

## 2024-03-04 PROCEDURE — 80053 COMPREHEN METABOLIC PANEL: CPT

## 2024-03-04 PROCEDURE — 3008F BODY MASS INDEX DOCD: CPT | Performed by: NURSE PRACTITIONER

## 2024-03-04 PROCEDURE — 99214 OFFICE O/P EST MOD 30 MIN: CPT | Performed by: NURSE PRACTITIONER

## 2024-03-04 PROCEDURE — 85025 COMPLETE CBC W/AUTO DIFF WBC: CPT

## 2024-03-04 RX ORDER — LEVOFLOXACIN 500 MG/1
500 TABLET, FILM COATED ORAL DAILY
Qty: 7 TABLET | Refills: 0 | Status: SHIPPED | OUTPATIENT
Start: 2024-03-04 | End: 2024-03-11

## 2024-03-04 ASSESSMENT — PATIENT HEALTH QUESTIONNAIRE - PHQ9
1. LITTLE INTEREST OR PLEASURE IN DOING THINGS: NOT AT ALL
SUM OF ALL RESPONSES TO PHQ9 QUESTIONS 1 AND 2: 0
2. FEELING DOWN, DEPRESSED OR HOPELESS: NOT AT ALL

## 2024-03-04 ASSESSMENT — ENCOUNTER SYMPTOMS
CHILLS: 0
COUGH: 1
FEVER: 0

## 2024-03-04 NOTE — PATIENT INSTRUCTIONS
Levaquin 1 a day x 7d  Zyrtec 1 a day x 2wks. otc    EKG  Chest xray and labs today  See cardio dr    Return in sept for wellness    I will communicate with you via [employer] regarding messages and results. If you need help with this, you can call the support line at [phone].    IT WAS A PLEASURE TO SEE YOU TODAY. THANK YOU FOR CHOOSING US FOR YOUR HEALTHCARE NEEDS.

## 2024-03-19 ENCOUNTER — OFFICE VISIT (OUTPATIENT)
Dept: OPHTHALMOLOGY | Facility: CLINIC | Age: 44
End: 2024-03-19
Payer: COMMERCIAL

## 2024-03-19 DIAGNOSIS — H43.89 VITREO-RETINAL ADHESIONS: ICD-10-CM

## 2024-03-19 DIAGNOSIS — H52.4 MYOPIA OF BOTH EYES WITH ASTIGMATISM AND PRESBYOPIA: Primary | ICD-10-CM

## 2024-03-19 DIAGNOSIS — H52.13 MYOPIA OF BOTH EYES WITH ASTIGMATISM AND PRESBYOPIA: Primary | ICD-10-CM

## 2024-03-19 DIAGNOSIS — H52.203 MYOPIA OF BOTH EYES WITH ASTIGMATISM AND PRESBYOPIA: Primary | ICD-10-CM

## 2024-03-19 PROCEDURE — 92015 DETERMINE REFRACTIVE STATE: CPT | Performed by: OPTOMETRIST

## 2024-03-19 PROCEDURE — 92014 COMPRE OPH EXAM EST PT 1/>: CPT | Performed by: OPTOMETRIST

## 2024-03-19 PROCEDURE — 92310 CONTACT LENS FITTING OU: CPT | Performed by: OPTOMETRIST

## 2024-03-19 ASSESSMENT — REFRACTION_MANIFEST
OD_SPHERE: -6.00
OD_CYLINDER: -0.50
OD_AXIS: 160
OS_SPHERE: -4.25
OS_AXIS: 167
METHOD_AUTOREFRACTION: 1
OS_CYLINDER: -0.50
OS_CYLINDER: -1.00
OD_SPHERE: -5.75
OS_SPHERE: -4.25
OS_AXIS: 175
OD_AXIS: 166
OD_CYLINDER: -0.50

## 2024-03-19 ASSESSMENT — ENCOUNTER SYMPTOMS
RESPIRATORY NEGATIVE: 0
CONSTITUTIONAL NEGATIVE: 0
ALLERGIC/IMMUNOLOGIC NEGATIVE: 0
HEMATOLOGIC/LYMPHATIC NEGATIVE: 0
EYES NEGATIVE: 1
NEUROLOGICAL NEGATIVE: 0
MUSCULOSKELETAL NEGATIVE: 0
CARDIOVASCULAR NEGATIVE: 0
GASTROINTESTINAL NEGATIVE: 0
PSYCHIATRIC NEGATIVE: 0
ENDOCRINE NEGATIVE: 0

## 2024-03-19 ASSESSMENT — REFRACTION_CURRENTRX
OD_SPHERE: -5.25
OS_SPHERE: -4.50
OS_BASECURVE: 8.6
OD_CYLINDER: SPHERE
OS_CYLINDER: SPHERE
OD_DIAMETER: 14.2
OD_BASECURVE: 8.6
OS_SPHERE: -4.75
OS_BASECURVE: 8.3
OS_BRAND: PRECISION 1
OD_BASECURVE: 8.3
OD_BRAND: BIOTRUE ONE DAY
OD_DIAMETER: 14.2
OD_BRAND: PRECISION 1
OS_BRAND: PRECISION 1
OS_BRAND: BIOTRUE ONE DAY
OS_SPHERE: -4.75
OS_DIAMETER: 14.2
OS_BASECURVE: 8.3
OD_SPHERE: -5.25
OD_SPHERE: -5.00
OD_BASECURVE: 8.3
OD_DIAMETER: 14.2
OS_DIAMETER: 14.2
OD_BRAND: PRECISION 1
OS_DIAMETER: 14.2

## 2024-03-19 ASSESSMENT — VISUAL ACUITY
OS_CC+: -2
METHOD: SNELLEN - LINEAR
OS_SC: J1+
OD_SC: J1+
OD_CC: 20/20
VA_OR_OS_CURRENT_RX: 20/40-1
VA_OR_OD_CURRENT_RX: 20/20-2
OS_CC: 20/40
CORRECTION_TYPE: GLASSES

## 2024-03-19 ASSESSMENT — REFRACTION_WEARINGRX
OD_CYLINDER: -0.50
OS_CYLINDER: -0.75
OS_SPHERE: -4.25
OD_SPHERE: -5.50
OS_AXIS: 175
OD_AXIS: 155

## 2024-03-19 ASSESSMENT — TONOMETRY
OD_IOP_MMHG: 15
IOP_METHOD: GOLDMANN APPLANATION
OS_IOP_MMHG: 16

## 2024-03-19 ASSESSMENT — SLIT LAMP EXAM - LIDS
COMMENTS: GOOD POSITION
COMMENTS: GOOD POSITION

## 2024-03-19 ASSESSMENT — CUP TO DISC RATIO
OD_RATIO: .15
OS_RATIO: .25

## 2024-03-19 ASSESSMENT — CONF VISUAL FIELD
OD_SUPERIOR_TEMPORAL_RESTRICTION: 0
OS_INFERIOR_TEMPORAL_RESTRICTION: 0
OD_NORMAL: 1
OD_SUPERIOR_NASAL_RESTRICTION: 0
OS_SUPERIOR_NASAL_RESTRICTION: 0
OS_INFERIOR_NASAL_RESTRICTION: 0
OS_SUPERIOR_TEMPORAL_RESTRICTION: 0
OS_NORMAL: 1
OD_INFERIOR_NASAL_RESTRICTION: 0
OD_INFERIOR_TEMPORAL_RESTRICTION: 0

## 2024-03-19 ASSESSMENT — EXTERNAL EXAM - LEFT EYE: OS_EXAM: NORMAL

## 2024-03-19 ASSESSMENT — EXTERNAL EXAM - RIGHT EYE: OD_EXAM: NORMAL

## 2024-03-19 NOTE — PROGRESS NOTES
A spectacle prescription was dispensed to be used as needed. A contact lens prescription was dispensed at the patient`s request.   Keeps getting SCHs and pt reassured and has gotten better. Not on blood thinners. Has allergies and may be rubbing eyes. Small pingueculae. Will monitor.   VR tuft at 2 OD and The patient was asked to return to our clinic or seek out eye care ASAP if new flashes of light or floaters are noted.      The patient was asked to return to our clinic in one year or sooner if ocular or vision changes occur

## 2024-04-02 ENCOUNTER — OFFICE VISIT (OUTPATIENT)
Dept: OBSTETRICS AND GYNECOLOGY | Facility: CLINIC | Age: 44
End: 2024-04-02
Payer: COMMERCIAL

## 2024-04-02 ENCOUNTER — LAB (OUTPATIENT)
Dept: LAB | Facility: LAB | Age: 44
End: 2024-04-02
Payer: COMMERCIAL

## 2024-04-02 VITALS
HEIGHT: 64 IN | DIASTOLIC BLOOD PRESSURE: 70 MMHG | BODY MASS INDEX: 21.34 KG/M2 | SYSTOLIC BLOOD PRESSURE: 106 MMHG | WEIGHT: 125 LBS

## 2024-04-02 DIAGNOSIS — Z01.419 WELL WOMAN EXAM: ICD-10-CM

## 2024-04-02 DIAGNOSIS — N81.6 RECTOCELE: ICD-10-CM

## 2024-04-02 DIAGNOSIS — Z12.11 COLON CANCER SCREENING: ICD-10-CM

## 2024-04-02 DIAGNOSIS — N95.1 PERIMENOPAUSAL SYMPTOM: ICD-10-CM

## 2024-04-02 DIAGNOSIS — Z12.31 ENCOUNTER FOR SCREENING MAMMOGRAM FOR BREAST CANCER: ICD-10-CM

## 2024-04-02 DIAGNOSIS — N95.1 PERIMENOPAUSAL SYMPTOM: Primary | ICD-10-CM

## 2024-04-02 LAB — FSH SERPL-ACNC: 3.3 IU/L

## 2024-04-02 PROCEDURE — 1036F TOBACCO NON-USER: CPT | Performed by: OBSTETRICS & GYNECOLOGY

## 2024-04-02 PROCEDURE — 36415 COLL VENOUS BLD VENIPUNCTURE: CPT

## 2024-04-02 PROCEDURE — 83001 ASSAY OF GONADOTROPIN (FSH): CPT

## 2024-04-02 PROCEDURE — 88141 CYTOPATH C/V INTERPRET: CPT | Performed by: STUDENT IN AN ORGANIZED HEALTH CARE EDUCATION/TRAINING PROGRAM

## 2024-04-02 PROCEDURE — 99386 PREV VISIT NEW AGE 40-64: CPT | Performed by: OBSTETRICS & GYNECOLOGY

## 2024-04-02 PROCEDURE — 3008F BODY MASS INDEX DOCD: CPT | Performed by: OBSTETRICS & GYNECOLOGY

## 2024-04-02 PROCEDURE — 88175 CYTOPATH C/V AUTO FLUID REDO: CPT

## 2024-04-02 ASSESSMENT — PAIN SCALES - GENERAL: PAINLEVEL: 0-NO PAIN

## 2024-04-02 NOTE — PROGRESS NOTES
"Kaylah Parham is a 44 y.o. female who is here for a routine exam. PCP = Neena Toledo, APRN-CNP  Patient concerned about perimenopause.  Menses more frequent.  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 24 days, more frequent than normal  Contraception : other  HPV vaccine : No  Last pap : 2023 normal  Last HPV : 2023 negative  History of abnormal pap : No  Last mammogram : 2023  History of abnormal mammogram : Yes, describe: Left breast calcification  Colon cancer screen : Never    ROS  systems reviewed, anything negative noted in HPI    bladder: no dysuria, gross hematuria, urinary frequency, urgency or incontinence  breast: no lumps, nipple d/c, overlying skin changes, redness, or skin retraction    [unfilled]    Past med hx and past surg hx reviewed and notable for: none    Objective   /70   Ht 1.626 m (5' 4\")   Wt 56.7 kg (125 lb)   LMP 03/11/2024 (Exact Date)   BMI 21.46 kg/m²      General:   Alert and oriented, in no acute distress   Neck: Supple. No visible thyromegaly.    Breast/Axilla: Normal to palpation bilaterally without masses, skin changes, lymphadenopathy, or nipple discharge.    Abdomen: Soft, non-tender, without masses or organomegaly   Vulva: Normal architecture without erythema, masses, or lesions.    Vagina: Normal mucosa without lesions, masses, or atrophy. No abnormal vaginal discharge.    Cervix: Normal without masses, lesions, or signs of cervicitis.    Uterus: Normal mobile, non-enlarged uterus    Adnexa: Normal without masses or lesions   Pelvic Floor No POP noted.    Psych Normal affect. Normal mood.      Thank you for coming to your annual exam. Your findings during the exam were normal. Specific topics addressed during this exam included: healthy lifestyle, well woman screening " guidelines,     Actions performed during this visit include:  - Clinical breast exam  - Clinical pelvic exam  - pap  - colonoscopy referral  Orders Placed This Encounter   Procedures    BI mammo bilateral screening tomosynthesis    Follicle Stimulating Hormone    Referral to Physical Therapy           Please return for your next visit in 1 year.

## 2024-04-04 ENCOUNTER — APPOINTMENT (OUTPATIENT)
Dept: CARDIOLOGY | Facility: CLINIC | Age: 44
End: 2024-04-04
Payer: COMMERCIAL

## 2024-04-04 ENCOUNTER — OFFICE VISIT (OUTPATIENT)
Dept: CARDIOLOGY | Facility: CLINIC | Age: 44
End: 2024-04-04
Payer: COMMERCIAL

## 2024-04-04 VITALS
HEIGHT: 64 IN | TEMPERATURE: 97.8 F | HEART RATE: 64 BPM | SYSTOLIC BLOOD PRESSURE: 132 MMHG | DIASTOLIC BLOOD PRESSURE: 80 MMHG | WEIGHT: 125 LBS | BODY MASS INDEX: 21.34 KG/M2

## 2024-04-04 DIAGNOSIS — G93.39 OTHER POST INFECTION AND RELATED FATIGUE SYNDROMES: ICD-10-CM

## 2024-04-04 DIAGNOSIS — R42 ORTHOSTATIC DIZZINESS: ICD-10-CM

## 2024-04-04 DIAGNOSIS — F41.9 ANXIETY: ICD-10-CM

## 2024-04-04 DIAGNOSIS — R07.89 ATYPICAL CHEST PAIN: Primary | ICD-10-CM

## 2024-04-04 DIAGNOSIS — R00.2 PALPITATIONS: ICD-10-CM

## 2024-04-04 PROBLEM — R53.83 FATIGUE: Status: ACTIVE | Noted: 2024-04-04

## 2024-04-04 PROCEDURE — 99204 OFFICE O/P NEW MOD 45 MIN: CPT | Performed by: INTERNAL MEDICINE

## 2024-04-04 PROCEDURE — 3008F BODY MASS INDEX DOCD: CPT | Performed by: INTERNAL MEDICINE

## 2024-04-04 PROCEDURE — 1036F TOBACCO NON-USER: CPT | Performed by: INTERNAL MEDICINE

## 2024-04-04 ASSESSMENT — ENCOUNTER SYMPTOMS
FATIGUE: 1
NERVOUS/ANXIOUS: 1
PALPITATIONS: 1

## 2024-04-04 NOTE — PROGRESS NOTES
CARDIOLOGY NEW PATIENT OFFICE VISIT    Patient:  Kaylah Parham  YOB: 1980  MRN: [id number]       Chief Complaint/Active Symptoms:       Kaylah Parham is a 44 y.o. female who is being seen today at the request of [place] NP for evaluation of Chest pain.    Chief Complaint   Patient presents with    Establish Care     Patient present to discuss chest pain.        History of Present Illness:   HPI    Had chest discomfort back in March of this year associated and following an URI where she had a lot of cough that was productive but thick. Started out as a dull pain throughout the chest then focused in the left chest then more focused as a sharp pain under the left breast. Start to finish it was present for a week. Initially was present most of the day towards the end was constant. No pattern to the discomfort. No clear exacerbating or alleviating factors. Resolved spontaneously as she recovered from her respiratory infection. Has not had pain since it resolved.  Since this time however she has felt very fatigued and has not been as back as fast as she thought she should she still experiencing some fatigue    Had similar symptoms in November when she had a sinus infection and a heavy cough. More intermittent at that time but seemed to resolve after her coughing resolved.     Had a remote episode of chest pain for which she had a CT scan and was told it was musculoskeletal. No history of cardiac issues. Mother and MGM had atrial fibrillation, father has HTN. No first degree family members with CAD, particularly at a young age.     Separate from this deals with anxiety. At times when it is severe she has associated chest tightness. Is trying to deal with her anxiety and tried acupuncture but has not pursued the issue with her NP.  She also has episodes of palpitations that she describes as a brief fluttering within her left chest.  There is no associated dizziness or lightheadedness.  No sustained rapid or  irregular heartbeats.  She has occasional episodes of transient dizziness and lightheadedness with standing no syncope.  These are not associated with the fluttering sensations in her chest.    Allergies:     Allergies   Allergen Reactions    Doxycycline Headache    Penicillins Rash        Outpatient Medications:     Current Outpatient Medications   Medication Instructions    multivitamin tablet 1 tablet, oral, Daily        Past Medical History:     Past Medical History:   Diagnosis Date    Personal history of other diseases of the digestive system 03/22/2022    History of acute gastritis       Social History:     Social History     Socioeconomic History    Marital status:      Spouse name: None    Number of children: None    Years of education: None    Highest education level: None   Occupational History    None   Tobacco Use    Smoking status: Never    Smokeless tobacco: Never   Substance and Sexual Activity    Alcohol use: Never    Drug use: Never    Sexual activity: None   Other Topics Concern    None   Social History Narrative    None     Social Determinants of Health     Financial Resource Strain: Not on file   Food Insecurity: Not on file   Transportation Needs: Not on file   Physical Activity: Not on file   Stress: Not on file   Social Connections: Not on file   Intimate Partner Violence: Not on file   Housing Stability: Not on file       Family History:        Family History   Problem Relation Name Age of Onset    Atrial fibrillation Mother      Osteopenia Mother      Macular degeneration Mother      Depression Father      Hypertension Father      Atrial fibrillation Maternal Grandmother      Heart attack Maternal Grandmother      Macular degeneration Maternal Grandmother      Multiple myeloma Paternal Grandmother      Lung cancer Paternal Grandfather      Colon cancer Maternal Great-Grandmother      Heart attack Maternal Great-Grandmother      Heart attack Maternal Great-Grandfather         Review  of Systems:     Review of Systems   Constitutional:  Positive for fatigue.   HENT:  Positive for postnasal drip.    Cardiovascular:  Positive for chest pain and palpitations. Negative for leg swelling.   Psychiatric/Behavioral:  The patient is nervous/anxious.    All other systems reviewed and are negative.      Objective:     Vitals:    04/04/24 0811   BP: 132/80   Pulse: 64   Temp: 36.6 °C (97.8 °F)       Vitals:    04/04/24 0811   Weight: 56.7 kg (125 lb)       Physical Examination:   GENERAL:  Well developed, well nourished, in no acute distress.  HEENT: NC AT EOMI with anicteric sclera  NECK:  Supple, no JVD, no bruit.  CHEST:  Symmetric and nontender.  LUNGS:  Normal respiratory effort. Bilateral breath sounds clear to auscultation.   HEART:  PMI nondisplaced. Rate and rhythm regular with no evident murmur, no gallop appreciated. There are no rubs, clicks or heaves.  PERIPHERAL VASCULAR:  Pulses present and equally palpable; 2+ throughout.  ABDOMEN: Soft, NT, ND without HSM or palpable organomegaly, no bruits, normoactive bowel sounds  MUSCULOSKELETAL: No significant joint or limb deformity  EXTREMITIES:  Warm with good color, no clubbing or cyanosis.  There is no edema noted.  NEURO/PSYCH:  Alert and oriented times three with approppriate behavior and responses.  LYMPH: no significant palpable lymphadenopathy  SKIN: No rashes on exposed skin, no reported skin lesions.     Lab:     CBC:   Lab Results   Component Value Date    WBC 7.6 03/04/2024    RBC 4.69 03/04/2024    HGB 13.4 03/04/2024    HCT 40.8 03/04/2024     03/04/2024      Lab Results   Component Value Date    WBC 7.6 03/04/2024    WBC 6.9 09/25/2023    WBC 4.6 11/11/2021    RBC 4.69 03/04/2024    RBC 4.24 09/25/2023    RBC 4.35 11/11/2021    HGB 13.4 03/04/2024    HGB 12.7 09/25/2023    HGB 13.0 11/11/2021    HCT 40.8 03/04/2024    HCT 38.3 09/25/2023    HCT 38.7 11/11/2021    MCV 87 03/04/2024    MCV 90 09/25/2023    MCV 89 11/11/2021    MCH  "28.6 03/04/2024    MCHC 32.8 03/04/2024    MCHC 33.2 09/25/2023    MCHC 33.6 11/11/2021    RDW 12.7 03/04/2024    RDW 12.0 09/25/2023    RDW 12.3 11/11/2021     03/04/2024     09/25/2023     11/11/2021       CMP:    Lab Results   Component Value Date     03/04/2024    K 4.5 03/04/2024     03/04/2024    CO2 26 03/04/2024    BUN 13 03/04/2024    CREATININE 0.80 03/04/2024    GLUCOSE 98 03/04/2024    CALCIUM 9.3 03/04/2024     Lab Results   Component Value Date     03/04/2024     09/25/2023     11/11/2021    K 4.5 03/04/2024    K 4.1 09/25/2023    K 4.3 11/11/2021     03/04/2024     09/25/2023     11/11/2021    CO2 26 03/04/2024    CO2 28 09/25/2023    CO2 25 11/11/2021    BUN 13 03/04/2024    BUN 13 09/25/2023    BUN 14 11/11/2021    CREATININE 0.80 03/04/2024    CREATININE 0.82 09/25/2023    CREATININE 0.84 11/11/2021     Lab Results   Component Value Date    ALKPHOS 47 03/04/2024    ALT 9 03/04/2024    AST 13 03/04/2024    PROT 6.5 03/04/2024    BILITOT 2.5 (H) 03/04/2024       Magnesium:    No results found for: \"MG\"    Lipid Profile:    Lab Results   Component Value Date    TRIG 95 09/25/2023    HDL 54.2 09/25/2023       TSH:    Lab Results   Component Value Date    TSH 2.06 03/04/2024       BNP:   No results found for: \"BNP\"     PT/INR:    No results found for: \"PROTIME\", \"INR\"    HgBA1c:    No results found for: \"HGBA1C\"    Cardiac Enzymes:    No results found for: \"TROPHS\"      Diagnostic Studies:   Outside EKG from the time of her presentation in March showed normal sinus rhythm with borderline low voltage QRS normal EKG  No echocardiogram results found for the past 12 months    No nuclear medicine results found for the past 12 months    No valid procedures specified.    Radiology:     No valid procedures specified.    Assessment:     Problem List Items Addressed This Visit       Anxiety    Atypical chest pain - Primary    Palpitations    " Orthostatic dizziness    Fatigue       Plan:   1.  Atypical chest pain.  The patient's symptoms of chest discomfort are not suggestive of angina or pericarditis.  They are most likely a form of musculoskeletal discomfort associated with heavy coughing.  She has no risk factors for premature coronary artery disease and her EKG is normal.  For now I have no recommendations for any additional testing at this time.  We did briefly review some symptoms of pericarditis that she could experience and that if she has any severe chest discomfort to come at the time she has the symptoms for evaluation.  2.  Palpitations.  Her palpitations are benign by description and most likely do not represent any arrhythmia.  We have asked her to monitor symptoms and if they change to return for evaluation.  3.  Orthostatic dizziness.  This is not an uncommon phenomenon.  We have encouraged her to remain hydrated and just stand still for a bit when first getting up to avoid loss of balance associated with this or fall.  Hydration is the most important thing to do at this time.  4.  Tobacco and weight status.  The patient is a lifelong non-smoker and is at her ideal body weight with a BMI of 21.  5.  Fatigue.  With regard to her fatigue I think this is multifactorial.  Her primary care physician already did lab work back in March that ruled out anemia and thyroid disorders.  There are no signs or symptoms of heart failure.  Electrolytes are all within normal limits.  We have talked about good sleep, good nutrition and good exercise as some basic building blocks for feeling well and healthy and with her anxiety I would have some concerns if there is also not a component of depression as long as she is getting appropriate sleep nutrition and exercise.  We referred her back to her primary care for this problem and ongoing discussions related to her anxiety.    Will see the patient back on an as-needed basis.  Thank you for this referral.

## 2024-04-12 LAB
CYTOLOGY CMNT CVX/VAG CYTO-IMP: NORMAL
LAB AP HPV GENOTYPE QUESTION: YES
LAB AP HPV HR: NORMAL
LABORATORY COMMENT REPORT: NORMAL
LMP START DATE: NORMAL
PATH REPORT.TOTAL CANCER: NORMAL

## 2024-04-13 ENCOUNTER — PATIENT MESSAGE (OUTPATIENT)
Dept: PRIMARY CARE | Facility: CLINIC | Age: 44
End: 2024-04-13
Payer: COMMERCIAL

## 2024-04-13 DIAGNOSIS — R19.7 DIARRHEA, UNSPECIFIED TYPE: Primary | ICD-10-CM

## 2024-04-15 ENCOUNTER — TELEPHONE (OUTPATIENT)
Dept: PRIMARY CARE | Facility: CLINIC | Age: 44
End: 2024-04-15
Payer: COMMERCIAL

## 2024-04-15 NOTE — TELEPHONE ENCOUNTER
Pls put together a stool sample kit for this pt.  One orange cup only  Cross out the orange cup instructions for the refrigerated and the green cup instructions  She will use the orange cup and keep at room temp.  Put up front for

## 2024-04-16 ENCOUNTER — APPOINTMENT (OUTPATIENT)
Dept: OTOLARYNGOLOGY | Facility: CLINIC | Age: 44
End: 2024-04-16
Payer: COMMERCIAL

## 2024-04-18 LAB
NON-UH HIE GA INT QC: PRESENT
NON-UH HIE GIARDIA ANTIGEN: NEGATIVE

## 2024-05-02 ENCOUNTER — OFFICE VISIT (OUTPATIENT)
Dept: CARDIOLOGY | Facility: CLINIC | Age: 44
End: 2024-05-02
Payer: COMMERCIAL

## 2024-05-02 VITALS
SYSTOLIC BLOOD PRESSURE: 110 MMHG | BODY MASS INDEX: 21.34 KG/M2 | DIASTOLIC BLOOD PRESSURE: 72 MMHG | TEMPERATURE: 97.3 F | WEIGHT: 125 LBS | HEIGHT: 64 IN | HEART RATE: 72 BPM

## 2024-05-02 DIAGNOSIS — R07.89 ATYPICAL CHEST PAIN: Primary | ICD-10-CM

## 2024-05-02 DIAGNOSIS — G93.39 OTHER POST INFECTION AND RELATED FATIGUE SYNDROMES: ICD-10-CM

## 2024-05-02 PROCEDURE — 3008F BODY MASS INDEX DOCD: CPT | Performed by: INTERNAL MEDICINE

## 2024-05-02 PROCEDURE — 1036F TOBACCO NON-USER: CPT | Performed by: INTERNAL MEDICINE

## 2024-05-02 PROCEDURE — 99213 OFFICE O/P EST LOW 20 MIN: CPT | Performed by: INTERNAL MEDICINE

## 2024-05-02 ASSESSMENT — ENCOUNTER SYMPTOMS
PALPITATIONS: 0
RESPIRATORY NEGATIVE: 1
CHILLS: 0
GASTROINTESTINAL NEGATIVE: 1
NERVOUS/ANXIOUS: 1
FEVER: 0
FATIGUE: 1

## 2024-05-02 NOTE — PROGRESS NOTES
"  Patient:  Kaylah Parham  YOB: 1980  MRN: [id number]       Chief Complaint/Active Symptoms:       Kaylah Parham is a 44 y.o. female who returns today for cardiac follow-up.    Back with chest pain. Occurs every few days and lasts for several hours and resolves spontaneously. No pattern with eating, activity, position but more in the evening. Is mild 2/10 in intensity. Still gets anxious over it. No other symptoms that come with and resolve with the chest discomfort. This discomfort is a little different than what she had when she saw me in early April.     Went to the  ER in [place] a week ago with the discomfort. Was told everything was fine. C spine had \"degeneration\" in her spine but all other testing was normal.     Has had a lot of \"sinus drainage' in the  back of her throat. Has never been previously diagnosed with allergic rhinitis or issues like that. Currently is clear but previously was some yellow discoloration.     Review of Systems   Constitutional:  Positive for fatigue. Negative for chills and fever.   HENT:  Positive for congestion.    Respiratory: Negative.     Cardiovascular:  Positive for chest pain. Negative for palpitations and leg swelling.   Gastrointestinal: Negative.    Genitourinary: Negative.    Psychiatric/Behavioral:  The patient is nervous/anxious.        Objective:     Vitals:    05/02/24 1041   BP: 110/72   Pulse: 72   Temp: 36.3 °C (97.3 °F)       Vitals:    05/02/24 1041   Weight: 56.7 kg (125 lb)       Allergies:     Allergies   Allergen Reactions    Doxycycline Headache    Penicillins Rash          Medications:     Current Outpatient Medications   Medication Instructions    multivitamin tablet 1 tablet, oral, Daily       Physical Examination:   GENERAL:  Well developed, well nourished, in no acute distress.  HEENT: NC AT, EOMI with anicteric sclera  NECK:  Supple, no JVD.  CHEST:  Symmetric and nontender.  LUNGS:  Clear to auscultation bilaterally, normal " "respiratory effort.  HEART:  PMI is nondisplaced. RRR with normal S1 and S2, no S3, no mumur or rub. No carotid or abdominal bruits  EXTREMITIES:  Warm with good color, no clubbing or cyanosis.  There is no edema noted.  PERIPHERAL VASCULAR:  Pulses present and equally palpable; 2+ throughout.  MUSCULOSKELETAL: No significant joint or limb deformity  NEURO/PSYCH:  Alert and oriented times three with approppriate behavior and responses. Nonfocal motor examination with normal gait and ambulation    Lab:     CBC:   Lab Results   Component Value Date    WBC 7.6 03/04/2024    RBC 4.69 03/04/2024    HGB 13.4 03/04/2024    HCT 40.8 03/04/2024     03/04/2024        CMP:    Lab Results   Component Value Date     03/04/2024    K 4.5 03/04/2024     03/04/2024    CO2 26 03/04/2024    BUN 13 03/04/2024    CREATININE 0.80 03/04/2024    GLUCOSE 98 03/04/2024    CALCIUM 9.3 03/04/2024       Magnesium:    No results found for: \"MG\"    Lipid Profile:    Lab Results   Component Value Date    TRIG 95 09/25/2023    HDL 54.2 09/25/2023       TSH:    Lab Results   Component Value Date    TSH 2.06 03/04/2024       BNP:   No results found for: \"BNP\"     PT/INR:    No results found for: \"PROTIME\", \"INR\"    HgBA1c:    No results found for: \"HGBA1C\"    BMP:  Lab Results   Component Value Date     03/04/2024     09/25/2023     11/11/2021    K 4.5 03/04/2024    K 4.1 09/25/2023    K 4.3 11/11/2021     03/04/2024     09/25/2023     11/11/2021    CO2 26 03/04/2024    CO2 28 09/25/2023    CO2 25 11/11/2021    BUN 13 03/04/2024    BUN 13 09/25/2023    BUN 14 11/11/2021    CREATININE 0.80 03/04/2024    CREATININE 0.82 09/25/2023    CREATININE 0.84 11/11/2021       Cardiac Enzymes:    No results found for: \"TROPHS\"    Hepatic Function Panel:    Lab Results   Component Value Date    ALKPHOS 47 03/04/2024    ALT 9 03/04/2024    AST 13 03/04/2024    PROT 6.5 03/04/2024    BILITOT 2.5 (H) 03/04/2024 "         Diagnostic Studies:     No recent evaluation.  Reviewed limited results from [facility] which were described as being negative  Radiology:     No orders to display     ASSESSMENT     Problem List Items Addressed This Visit       Atypical chest pain - Primary    Relevant Orders    CT cardiac scoring wo IV contrast    Fatigue       PLAN     1.  Atypical chest pain.  Patient's symptoms are still not consistent with angina pectoris or pericarditis.  They could be radicular from cervical spine although it is in the left chest and down the right arm it could be either musculoskeletal but there is no clear pattern to them still.  We have offered the patient a CT cardiac score.  We did discuss that we normally like to hold off until age 49 but if her CT cardiac score is 0 we can certainly rule out myocardial infarction although this is already not suspected.    She has been diagnosed with cervical radiculopathy by some x-ray imaging from [facility] and is going to be seeing her primary care physician and it was recommended she have additional neck imaging.  She would like to wait the results of that testing to see how things go before proceeding.  The CT cardiac score is available to the patient for a year after the order but we have gone over some of the symptoms that would be concerning to return for an additional evaluation and she can feel free to come back at any time.

## 2024-05-21 ASSESSMENT — ENCOUNTER SYMPTOMS
CONSTITUTIONAL NEGATIVE: 1
SHORTNESS OF BREATH: 0
WHEEZING: 0

## 2024-05-22 ENCOUNTER — APPOINTMENT (OUTPATIENT)
Dept: PRIMARY CARE | Facility: CLINIC | Age: 44
End: 2024-05-22
Payer: COMMERCIAL

## 2024-05-22 NOTE — PROGRESS NOTES
Subjective   Patient ID: Kaylah Parham is a 44 y.o. female who presents for No chief complaint on file..  Last physical: 9/25/23; has next cpe scheduled  Last labs-3/2024 Sugar (aka glucose), kidney function, liver function and electrolytes in the CMP (comprehensive metabolic panel) were normal.  TSH (thyroid test) was normal.  CBC (complete blood count) was normal which looks at infection and anemia markers.  1/2024 cbc nl  9/2023 mickie nl, rf nl, sed rate, nl, ck nl, tsh nl, cmp nl, cbc nl, lipid nl  11/4/22 cmp nl, ldl 126, tsh nl, cbc nl     Did pt see cardio dr for chest pain?  If yes name-  When did neck pain start?  Where on the neck does it hurt?  Any fall or injury?  When did anxiety start?    Any other questions or concerns that pt wants to discuss today?    Phq9, gad7    HPI  Neck pain  location  how long  on off constant  spasms, stiffness  trigger-  popping when it happened  no fall or injury  ache, dull, burn, shooting, stabbing, spasms, popping, cracking  ROM-  affects ability to  job-  no new work activities or exercise routine  dominant side-right  pain worse with  pain right now  pain at worst  no redness, rash, warmth, bruising or swelling  no numbness, tingling, or weakness  no pain down the   not feel like leg is going to give out  able to bear wt  no wt loss, fever, or chills  no cp, heart racing, sob, wheezing, HA, dizziness, or vision change  no new bowel or bladder problems-no urinary incontinence or urinary retention or fecal incontinence; no uti sx; no abd pain; no progressive weakness or impaired coordination  no recent spinal procedure or IV drug use    h/o pain  h/o surgery  selftxt:     Anxiety  Stresses-  Meds in past-  Therapist in past-  Psychiatrist in past-  FH mental illness-  Eating-  Sleeping-  Exercise-  No suicidal thoughts or plans    No care team member to display     Review of Systems   Constitutional: Negative.    Respiratory:  Negative for shortness of breath and  wheezing.    Cardiovascular:  Negative for chest pain.       Objective   There were no vitals taken for this visit.   BP Readings from Last 3 Encounters:   05/02/24 110/72   04/04/24 132/80   04/02/24 106/70     Wt Readings from Last 3 Encounters:   05/02/24 56.7 kg (125 lb)   04/04/24 56.7 kg (125 lb)   04/02/24 56.7 kg (125 lb)       Physical Exam  Constitutional:       General: She is not in acute distress.     Appearance: Normal appearance.   Neurological:      Mental Status: She is alert.         Assessment/Plan   {Assess/PlanSmartLinks:[id number]}

## 2024-06-10 NOTE — PROGRESS NOTES
"Subjective   Patient ID: Kaylah Parham is a 44 y.o. female who presents for Breast Mass (Lump in right breast).    HPI   45 yo female, patient of Neena Toledo, presents complaining of a small lump in her right breast around the 1 o'clock position.  First noticed last week.  Tender only is pressing on it.  No nipple discharge.  No erythema or warmth or skin complaint.  History of left breast calcifications and had a biopsy in left axilla in past- benign  Her last bilateral mammogram was November 2023-stable and recommended repeat in 1 year.  History of fibrocystic breast.  Try to limit her caffeine.  Menses are regular.        Review of Systems  ROS as stated in HPI    Objective   /79   Pulse 78   Temp 37.2 °C (99 °F)   Ht 1.626 m (5' 4\")   Wt 55.6 kg (122 lb 9.6 oz)   BMI 21.04 kg/m²     Physical Exam  Constitutional: A&O; NAD  Breast:   Right breast with small palpable lesion on the 1 o'clock position.  Mobile.  Nontender.  No overlying skin changes.  No nipple discharge.   Axillae normal.   Assessment/Plan   Problem List Items Addressed This Visit    None  Visit Diagnoses         Codes    Breast lump on right side at 1 o'clock position    -  Primary N63.12    Relevant Orders    BI mammo right diagnostic tomosynthesis (Completed)    BI US breast complete right        Check Right sided mammogram and ultrasound.       "

## 2024-06-11 ENCOUNTER — HOSPITAL ENCOUNTER (OUTPATIENT)
Dept: RADIOLOGY | Facility: EXTERNAL LOCATION | Age: 44
Discharge: HOME | End: 2024-06-11

## 2024-06-11 ENCOUNTER — OFFICE VISIT (OUTPATIENT)
Dept: PRIMARY CARE | Facility: CLINIC | Age: 44
End: 2024-06-11
Payer: COMMERCIAL

## 2024-06-11 VITALS
TEMPERATURE: 99 F | SYSTOLIC BLOOD PRESSURE: 124 MMHG | HEIGHT: 64 IN | WEIGHT: 122.6 LBS | DIASTOLIC BLOOD PRESSURE: 79 MMHG | HEART RATE: 78 BPM | BODY MASS INDEX: 20.93 KG/M2

## 2024-06-11 DIAGNOSIS — N63.12 BREAST LUMP ON RIGHT SIDE AT 1 O'CLOCK POSITION: Primary | ICD-10-CM

## 2024-06-11 DIAGNOSIS — N63.12 BREAST LUMP ON RIGHT SIDE AT 1 O'CLOCK POSITION: ICD-10-CM

## 2024-06-11 PROCEDURE — 1036F TOBACCO NON-USER: CPT | Performed by: FAMILY MEDICINE

## 2024-06-11 PROCEDURE — 3008F BODY MASS INDEX DOCD: CPT | Performed by: FAMILY MEDICINE

## 2024-06-11 PROCEDURE — 99213 OFFICE O/P EST LOW 20 MIN: CPT | Performed by: FAMILY MEDICINE

## 2024-06-11 ASSESSMENT — PATIENT HEALTH QUESTIONNAIRE - PHQ9
2. FEELING DOWN, DEPRESSED OR HOPELESS: NOT AT ALL
SUM OF ALL RESPONSES TO PHQ9 QUESTIONS 1 AND 2: 0
1. LITTLE INTEREST OR PLEASURE IN DOING THINGS: NOT AT ALL

## 2024-06-26 ENCOUNTER — TELEPHONE (OUTPATIENT)
Dept: PRIMARY CARE | Facility: CLINIC | Age: 44
End: 2024-06-26
Payer: COMMERCIAL

## 2024-06-26 NOTE — TELEPHONE ENCOUNTER
----- Message from Jada Manjarrez DO sent at 6/26/2024  4:17 PM EDT -----  Please let patient know her mammogram and ultrasound showed a benign appearing simple cyst in her right breast but was otherwise normal.  Let us know if any further concerns.

## 2024-09-25 PROBLEM — R53.83 FATIGUE: Status: RESOLVED | Noted: 2024-04-04 | Resolved: 2024-09-25

## 2024-09-25 ASSESSMENT — ENCOUNTER SYMPTOMS
SHORTNESS OF BREATH: 0
HEMATURIA: 0
DYSURIA: 0
COUGH: 0
POLYPHAGIA: 0
CONFUSION: 0
UNEXPECTED WEIGHT CHANGE: 0
POLYDIPSIA: 0
BRUISES/BLEEDS EASILY: 0
EYE REDNESS: 0
FREQUENCY: 0
NECK PAIN: 0
APPETITE CHANGE: 0
FATIGUE: 0
HEADACHES: 0
CHILLS: 0
VOMITING: 0
HALLUCINATIONS: 0
EYE PAIN: 0
EYE DISCHARGE: 0
PALPITATIONS: 0
SORE THROAT: 0
BLOOD IN STOOL: 0
DIZZINESS: 0
ADENOPATHY: 0
ABDOMINAL PAIN: 0
FEVER: 0
WOUND: 0
BACK PAIN: 0
TROUBLE SWALLOWING: 0

## 2024-09-25 NOTE — PROGRESS NOTES
Subjective   Patient ID: Kaylah Parham is a 44 y.o. female who presents for Annual Exam.    Is pt fasting? yes  Does pt see any providers other than care team below:   staci Vergara, paige to, alek crandall, ezio- no    Does pt want flu shot? yes    Any questions or concerns today? Anxiety x years, thyroid US FU- found nodules     Phq9=9, gad7=20    Pt used to be a nurse    No care team member to display    HPI  Last labs-3/2024 Sugar (aka glucose), kidney function, liver function and electrolytes in the CMP (comprehensive metabolic panel) were normal.  TSH (thyroid test) was normal.  CBC (complete blood count) was normal which looks at infection and anemia markers.  1/2024 cbc nl  9/2023 mickie nl, rf nl, sed rate, nl, ck nl, tsh nl, cmp nl, cbc nl, lipid nl  11/4/22 cmp nl, ldl 126, tsh nl, cbc nl  Due for labs- all    Cholesterol   Date Value Ref Range Status   09/25/2023 164 0 - 199 mg/dL Final     Comment:     .      AGE      DESIRABLE   BORDERLINE HIGH   HIGH     0-19 Y     0 - 169       170 - 199     >/= 200    20-24 Y     0 - 189       190 - 224     >/= 225         >24 Y     0 - 199       200 - 239     >/= 240   **All ranges are based on fasting samples. Specific   therapeutic targets will vary based on patient-specific   cardiac risk.  .   Pediatric guidelines reference:Pediatrics 2011, 128(S5).   Adult guidelines reference: NCEP ATPIII Guidelines,     SAMANTHA 2001, 258:2486-97  .   Venipuncture immediately after or during the    administration of Metamizole may lead to falsely   low results. Testing should be performed immediately   prior to Metamizole dosing.   11/11/2021 163 0 - 199 mg/dL Final     Comment:     .      AGE      DESIRABLE   BORDERLINE HIGH   HIGH     0-19 Y     0 - 169       170 - 199     >/= 200    20-24 Y     0 - 189       190 - 224     >/= 225         >24 Y     0 - 199       200 - 239     >/= 240   **All ranges are based on fasting samples. Specific   therapeutic targets  will vary based on patient-specific   cardiac risk.  .   Pediatric guidelines reference:Pediatrics 2011, 128(S5).   Adult guidelines reference: NCEP ATPIII Guidelines,     SAMANTHA 2001, 258:2486-97  .   Venipuncture immediately after or during the    administration of Metamizole may lead to falsely   low results. Testing should be performed immediately   prior to Metamizole dosing.       Triglycerides   Date Value Ref Range Status   09/25/2023 95 0 - 149 mg/dL Final     Comment:     .      AGE      DESIRABLE   BORDERLINE HIGH   HIGH     VERY HIGH   0 D-90 D    19 - 174         ----         ----        ----  91 D- 9 Y     0 -  74        75 -  99     >/= 100      ----    10-19 Y     0 -  89        90 - 129     >/= 130      ----    20-24 Y     0 - 114       115 - 149     >/= 150      ----         >24 Y     0 - 149       150 - 199    200- 499    >/= 500  .   Venipuncture immediately after or during the    administration of Metamizole may lead to falsely   low results. Testing should be performed immediately   prior to Metamizole dosing.   11/11/2021 46 0 - 149 mg/dL Final     Comment:     .      AGE      DESIRABLE   BORDERLINE HIGH   HIGH     VERY HIGH   0 D-90 D    19 - 174         ----         ----        ----  91 D- 9 Y     0 -  74        75 -  99     >/= 100      ----    10-19 Y     0 -  89        90 - 129     >/= 130      ----    20-24 Y     0 - 114       115 - 149     >/= 150      ----         >24 Y     0 - 149       150 - 199    200- 499    >/= 500  .   Venipuncture immediately after or during the    administration of Metamizole may lead to falsely   low results. Testing should be performed immediately   prior to Metamizole dosing.       HDL   Date Value Ref Range Status   09/25/2023 54.2 mg/dL Final     Comment:     .      AGE      VERY LOW   LOW     NORMAL    HIGH       0-19 Y       < 35   < 40     40-45     ----    20-24 Y       ----   < 40       >45     ----      >24 Y       ----   < 40     40-60      >60  .  "  11/11/2021 50.9 mg/dL Final     Comment:     .      AGE      VERY LOW   LOW     NORMAL    HIGH       0-19 Y       < 35   < 40     40-45     ----    20-24 Y       ----   < 40       >45     ----      >24 Y       ----   < 40     40-60      >60  .       No results found for: \"LDL\"  Thyroid Stimulating Hormone   Date Value Ref Range Status   03/04/2024 2.06 0.44 - 3.98 mIU/L Final     No results found for: \"A1C\"  No components found for: \"POCA1C\"  No results found for: \"ALBUR\"  No components found for: \"POCALBUR\"      Other concerns: Anxiety x years  Worse x1mon  Anxiety with health and son has behavioral issues-changing meds and in school and moods  Hard to sleep-waking up worrying  Meds in past-none  Therapist in past-none  Psychiatrist in past-none  No suicidal thoughts or plans  FH mental illness:dad-depression    thyroid US 1/2024:  Multiple non-suspicious thyroid including one in the area of concern. No further testing needed.   Recently had lump in neck but not swollen now    Pain rt arm and numbness in fingers on off-has scoliosis, radiculopathy    bps at home- none    ER/urgicare visits in the last year- 4/2024 cp, 4/2024 diarrhea, 2/2024 ST  Hospitalizations in the last year- none      last Pap-4/2/24; due 4/2029  H/o abn pap-3 times ago; hpv was normal     ovarian, endometrial, cervical, uterine ca-none    Current birth control method-none  No change in contraception desired    last mammo- (40-75/90) 11/14/23; had diag rt mariama and us rt 6/26/24 due to rt breast lump; screening due 11/15+-has order  FH br ca-none    Has order for colon   colon ca-mat gr gm    Exercise- not consistently  Diet-2-3 meals   Body mass index is 21.28 kg/m².    last eye dr appt- contacts; 10mon ago  No vision issues    last dental appt- 3mon ago    BMs-regular with occas constipation-taking probiotic  Sleep-able to fall asleep but hard to stay asleep; no snoring or apnea 6-7 hrs  no cp, swelling, sob, abd pain, n/v/d/c, blood in " stool or black stools  STI testing including hiv (age 15-65) and hep c screening (18-79)-declines        Immunization History   Administered Date(s) Administered    Flu vaccine (IIV4), preservative free *Check age/dose* 09/06/2019, 08/27/2020, 09/25/2023    Flu vaccine, quadrivalent, recombinant, preservative free, adult (FLUBLOK) 09/24/2021    Hep B, Unspecified 08/01/1997    Influenza, Unspecified 09/01/2009, 09/01/2017    Influenza, injectable, quadrivalent 09/21/2017, 09/24/2018, 10/01/2019, 09/01/2020    MMR vaccine, subcutaneous (MMR II) 08/01/1997    Moderna SARS-CoV-2 Vaccination 04/21/2021, 05/21/2021, 12/14/2021    Tdap vaccine, age 7 year and older (BOOSTRIX, ADACEL) 08/01/2014, 07/01/2015, 07/27/2017, 01/02/2018     Flu shot-today    fractures in lifetime-none  Fh osteoporosis-mom osteopenia    FH heart attack, heart surgery-mat gr gm-mi, mat gr gf-mi; mat gm-valve issues, afib, mi; mom-afib  FH stroke-none    Sees cardio dr  The 10-year ASCVD risk score (Ines ANDREWS, et al., 2019) is: 0.4%    Values used to calculate the score:      Age: 44 years      Sex: Female      Is Non- : No      Diabetic: No      Tobacco smoker: No      Systolic Blood Pressure: 110 mmHg      Is BP treated: No      HDL Cholesterol: 54.2 mg/dL      Total Cholesterol: 164 mg/dL        Review of Systems   Constitutional:  Negative for appetite change, chills, fatigue, fever and unexpected weight change.   HENT:  Negative for congestion, ear pain, sore throat and trouble swallowing.    Eyes:  Negative for pain, discharge and redness.   Respiratory:  Negative for cough and shortness of breath.    Cardiovascular:  Negative for chest pain and palpitations.   Gastrointestinal:  Negative for abdominal pain, blood in stool and vomiting.   Endocrine: Negative for polydipsia, polyphagia and polyuria.   Genitourinary:  Negative for dysuria, frequency, hematuria and urgency.   Musculoskeletal:  Negative for back pain and  neck pain.   Skin:  Negative for rash and wound.   Allergic/Immunologic: Negative for immunocompromised state.   Neurological:  Negative for dizziness, syncope and headaches.   Hematological:  Negative for adenopathy. Does not bruise/bleed easily.   Psychiatric/Behavioral:  Negative for confusion and hallucinations.        Objective   Visit Vitals  /75   Pulse 58   Temp 36.3 °C (97.3 °F)        BP Readings from Last 3 Encounters:   09/26/24 110/75   06/11/24 124/79   05/02/24 110/72     Wt Readings from Last 3 Encounters:   09/26/24 56.2 kg (124 lb)   06/11/24 55.6 kg (122 lb 9.6 oz)   05/02/24 56.7 kg (125 lb)           Physical Exam  Constitutional:       General: She is not in acute distress.     Appearance: Normal appearance. She is not ill-appearing.   HENT:      Head: Normocephalic.      Right Ear: Tympanic membrane, ear canal and external ear normal.      Left Ear: Tympanic membrane, ear canal and external ear normal.      Nose: Nose normal.      Mouth/Throat:      Mouth: Mucous membranes are moist.      Pharynx: Oropharynx is clear.   Eyes:      Extraocular Movements: Extraocular movements intact.      Conjunctiva/sclera: Conjunctivae normal.      Pupils: Pupils are equal, round, and reactive to light.   Cardiovascular:      Rate and Rhythm: Normal rate and regular rhythm.      Heart sounds: Normal heart sounds. No murmur heard.  Pulmonary:      Effort: Pulmonary effort is normal. No respiratory distress.      Breath sounds: Normal breath sounds. No wheezing, rhonchi or rales.   Abdominal:      General: Bowel sounds are normal.      Palpations: Abdomen is soft. There is no mass.      Tenderness: There is no abdominal tenderness.   Musculoskeletal:         General: No swelling or tenderness. Normal range of motion.      Cervical back: Normal range of motion and neck supple.      Right lower leg: No edema.      Left lower leg: No edema.   Skin:     General: Skin is warm.      Findings: No rash.    Neurological:      General: No focal deficit present.      Mental Status: She is alert and oriented to person, place, and time.      Cranial Nerves: No cranial nerve deficit.      Motor: No weakness.   Psychiatric:         Mood and Affect: Mood normal.         Behavior: Behavior normal.       Assessment/Plan   Diagnoses and all orders for this visit:  Routine general medical examination at a health care facility  -     Comprehensive Metabolic Panel; Future  -     CBC and Auto Differential; Future  -     Lipid Panel; Future  -     TSH with reflex to Free T4 if abnormal; Future  Anxiety  -     Vitamin D 25-Hydroxy,Total (for eval of Vitamin D levels); Future  -     Referral to Psychology; Future  -     busPIRone (Buspar) 5 mg tablet; Take 0.5-1 tablets (2.5-5 mg) by mouth 3 times a day as needed (anxiety).  BMI 21.0-21.9, adult  Other orders  -     Follow Up In Primary Care - Health Maintenance  -     Flu vaccine, trivalent, preservative free, age 6 months and greater (Fluarix/Fluzone/Flulaval)  -     Follow Up In Primary Care - Health Maintenance; Future

## 2024-09-26 ENCOUNTER — APPOINTMENT (OUTPATIENT)
Dept: PRIMARY CARE | Facility: CLINIC | Age: 44
End: 2024-09-26
Payer: COMMERCIAL

## 2024-09-26 VITALS
WEIGHT: 124 LBS | HEIGHT: 64 IN | OXYGEN SATURATION: 97 % | BODY MASS INDEX: 21.17 KG/M2 | SYSTOLIC BLOOD PRESSURE: 110 MMHG | DIASTOLIC BLOOD PRESSURE: 75 MMHG | TEMPERATURE: 97.3 F | HEART RATE: 58 BPM

## 2024-09-26 DIAGNOSIS — Z00.00 ROUTINE GENERAL MEDICAL EXAMINATION AT A HEALTH CARE FACILITY: Primary | ICD-10-CM

## 2024-09-26 DIAGNOSIS — F41.9 ANXIETY: ICD-10-CM

## 2024-09-26 LAB
NON-UH HIE A/G RATIO: 1.4
NON-UH HIE ALB: 4.1 G/DL (ref 3.4–5)
NON-UH HIE ALK PHOS: 61 UNIT/L (ref 45–117)
NON-UH HIE BASO COUNT: 0.06 X1000 (ref 0–0.2)
NON-UH HIE BASOS %: 0.9 %
NON-UH HIE BILIRUBIN, TOTAL: 2 MG/DL (ref 0.3–1.2)
NON-UH HIE BUN/CREAT RATIO: 11.1
NON-UH HIE BUN: 10 MG/DL (ref 9–23)
NON-UH HIE CALCIUM: 9.3 MG/DL (ref 8.7–10.4)
NON-UH HIE CALCULATED LDL CHOLESTEROL: 125 MG/DL (ref 60–130)
NON-UH HIE CALCULATED OSMOLALITY: 279 MOSM/KG (ref 275–295)
NON-UH HIE CHLORIDE: 106 MMOL/L (ref 98–107)
NON-UH HIE CHOLESTEROL: 197 MG/DL (ref 100–200)
NON-UH HIE CO2, VENOUS: 27 MMOL/L (ref 20–31)
NON-UH HIE CREATININE: 0.9 MG/DL (ref 0.5–0.8)
NON-UH HIE DIFF?: NO
NON-UH HIE EOS COUNT: 0.28 X1000 (ref 0–0.5)
NON-UH HIE EOSIN %: 4.4 %
NON-UH HIE GFR AA: >60
NON-UH HIE GLOBULIN: 2.9 G/DL
NON-UH HIE GLOMERULAR FILTRATION RATE: >60 ML/MIN/1.73M?
NON-UH HIE GLUCOSE: 105 MG/DL (ref 74–106)
NON-UH HIE GOT: 13 UNIT/L (ref 15–37)
NON-UH HIE GPT: 11 UNIT/L (ref 10–49)
NON-UH HIE HCT: 39.8 % (ref 36–46)
NON-UH HIE HDL CHOLESTEROL: 62 MG/DL (ref 40–60)
NON-UH HIE HGB: 13.7 G/DL (ref 12–16)
NON-UH HIE INSTR WBC: 6.4
NON-UH HIE K: 4.3 MMOL/L (ref 3.5–5.1)
NON-UH HIE LYMPH %: 19.5 %
NON-UH HIE LYMPH COUNT: 1.25 X1000 (ref 1.2–4.8)
NON-UH HIE MCH: 29.9 PG (ref 27–34)
NON-UH HIE MCHC: 34.3 G/DL (ref 32–37)
NON-UH HIE MCV: 87.1 FL (ref 80–100)
NON-UH HIE MONO %: 10.1 %
NON-UH HIE MONO COUNT: 0.65 X1000 (ref 0.1–1)
NON-UH HIE MPV: 10.1 FL (ref 7.4–10.4)
NON-UH HIE NA: 140 MMOL/L (ref 135–145)
NON-UH HIE NEUTROPHIL %: 65 %
NON-UH HIE NEUTROPHIL COUNT (ANC): 4.15 X1000 (ref 1.4–8.8)
NON-UH HIE NUCLEATED RBC: 0 /100WBC
NON-UH HIE PLATELET: 233 X10 (ref 150–450)
NON-UH HIE RBC: 4.57 X10 (ref 4.2–5.4)
NON-UH HIE RDW: 13.3 % (ref 11.5–14.5)
NON-UH HIE TOTAL CHOL/HDL CHOL RATIO: 3.2
NON-UH HIE TOTAL PROTEIN: 7 G/DL (ref 5.7–8.2)
NON-UH HIE TRIGLYCERIDES: 51 MG/DL (ref 30–150)
NON-UH HIE TSH: 2.29 UIU/ML (ref 0.55–4.78)
NON-UH HIE WBC: 6.4 X10 (ref 4.5–11)

## 2024-09-26 PROCEDURE — 90471 IMMUNIZATION ADMIN: CPT | Performed by: NURSE PRACTITIONER

## 2024-09-26 PROCEDURE — 90656 IIV3 VACC NO PRSV 0.5 ML IM: CPT | Performed by: NURSE PRACTITIONER

## 2024-09-26 PROCEDURE — 99396 PREV VISIT EST AGE 40-64: CPT | Performed by: NURSE PRACTITIONER

## 2024-09-26 PROCEDURE — 99213 OFFICE O/P EST LOW 20 MIN: CPT | Performed by: NURSE PRACTITIONER

## 2024-09-26 PROCEDURE — 3008F BODY MASS INDEX DOCD: CPT | Performed by: NURSE PRACTITIONER

## 2024-09-26 PROCEDURE — 1036F TOBACCO NON-USER: CPT | Performed by: NURSE PRACTITIONER

## 2024-09-26 RX ORDER — BUSPIRONE HYDROCHLORIDE 5 MG/1
2.5-5 TABLET ORAL 3 TIMES DAILY PRN
Qty: 90 TABLET | Refills: 5 | Status: SHIPPED | OUTPATIENT
Start: 2024-09-26 | End: 2025-09-26

## 2024-09-26 ASSESSMENT — PATIENT HEALTH QUESTIONNAIRE - PHQ9
SUM OF ALL RESPONSES TO PHQ9 QUESTIONS 1 AND 2: 1
7. TROUBLE CONCENTRATING ON THINGS, SUCH AS READING THE NEWSPAPER OR WATCHING TELEVISION: NEARLY EVERY DAY
3. TROUBLE FALLING OR STAYING ASLEEP OR SLEEPING TOO MUCH: MORE THAN HALF THE DAYS
2. FEELING DOWN, DEPRESSED OR HOPELESS: SEVERAL DAYS
6. FEELING BAD ABOUT YOURSELF - OR THAT YOU ARE A FAILURE OR HAVE LET YOURSELF OR YOUR FAMILY DOWN: SEVERAL DAYS
9. THOUGHTS THAT YOU WOULD BE BETTER OFF DEAD, OR OF HURTING YOURSELF: NOT AT ALL
10. IF YOU CHECKED OFF ANY PROBLEMS, HOW DIFFICULT HAVE THESE PROBLEMS MADE IT FOR YOU TO DO YOUR WORK, TAKE CARE OF THINGS AT HOME, OR GET ALONG WITH OTHER PEOPLE: NOT DIFFICULT AT ALL
8. MOVING OR SPEAKING SO SLOWLY THAT OTHER PEOPLE COULD HAVE NOTICED. OR THE OPPOSITE, BEING SO FIGETY OR RESTLESS THAT YOU HAVE BEEN MOVING AROUND A LOT MORE THAN USUAL: NOT AT ALL
1. LITTLE INTEREST OR PLEASURE IN DOING THINGS: NOT AT ALL
2. FEELING DOWN, DEPRESSED OR HOPELESS: NOT AT ALL
4. FEELING TIRED OR HAVING LITTLE ENERGY: SEVERAL DAYS
1. LITTLE INTEREST OR PLEASURE IN DOING THINGS: NOT AT ALL
5. POOR APPETITE OR OVEREATING: SEVERAL DAYS
SUM OF ALL RESPONSES TO PHQ9 QUESTIONS 1 AND 2: 0
SUM OF ALL RESPONSES TO PHQ QUESTIONS 1-9: 9

## 2024-09-26 ASSESSMENT — ANXIETY QUESTIONNAIRES
7. FEELING AFRAID AS IF SOMETHING AWFUL MIGHT HAPPEN: NEARLY EVERY DAY
4. TROUBLE RELAXING: NEARLY EVERY DAY
IF YOU CHECKED OFF ANY PROBLEMS ON THIS QUESTIONNAIRE, HOW DIFFICULT HAVE THESE PROBLEMS MADE IT FOR YOU TO DO YOUR WORK, TAKE CARE OF THINGS AT HOME, OR GET ALONG WITH OTHER PEOPLE: SOMEWHAT DIFFICULT
2. NOT BEING ABLE TO STOP OR CONTROL WORRYING: NEARLY EVERY DAY
6. BECOMING EASILY ANNOYED OR IRRITABLE: NEARLY EVERY DAY
GAD7 TOTAL SCORE: 20
5. BEING SO RESTLESS THAT IT IS HARD TO SIT STILL: NEARLY EVERY DAY
3. WORRYING TOO MUCH ABOUT DIFFERENT THINGS: MORE THAN HALF THE DAYS
1. FEELING NERVOUS, ANXIOUS, OR ON EDGE: NEARLY EVERY DAY

## 2024-09-26 NOTE — PATIENT INSTRUCTIONS
Let me know if want sleep med  Let me know if need daily anxiety med  Buspar as needed for anxiety  See therapist    Set up colonoscopy    Handouts given to pt:  physical handout        Labs:    You can use the lab in our building when fasting. The hrs are: Mon-Fri 7a-330p.  No Saturday hrs. (No Saturday hrs at [facility] either)  No appt needed, BUT YOU DO NEED THE PAPER ORDER.  OR   [facility] Mon-FrI 7a-5p, Sat 8a-12p   [place] Mon-Fri 730a-4p, Sat  8a-12p  [facility] [address] Mon-Thurs 630a-6p , Fri 630a-4p, Sat 8a-12p  [facility] [address] Mon-Fri 7a-630p and Sat. 7a-3p    Fasting is no food, drink, gum or mints other than water for 12 hrs.   Results will be back in 2-3 business days for most labs. It is always recommended for any orders (labs, xrays, ultrasounds,MRI, ct scan, procedures etc) to check with your insurance provider for expected costs or expenses to you.     Screenings:    To set up mammogram, call [phone]    You will get your results via phone from my medical assistant if you do not have MyChart.  OR  You will get your results via [employer]    If a result is urgent, I will call to speak to you.    Vaccines:  ---- flu vaccine-today        General recommendations:  Exercise-cardio 4-5d/wk 30min each day  Diet-Breakfast-toast (my favorite Gini Meng Delighful Multigrain or Srinivas's Killer Bread Good Seed thin-sliced)/bagel/English muffin-whole wheat flour as a 1st ingredient or cereal/oatmeal/granola bar-fiber 4g or more or protein like eggs or peanut butter; optional veggies  Lunch-protein, 1/2c carb or 2 slices bread, veg 1c  Dinner-protein, fist sized carb, veg 1c  Fruit 2 a day  Dairy 2 a day-milk, soy milk, almond milk, cheese, yogurt, cottage cheese  Snacks-Protein-hard boiled egg, nuts (walnuts/almonds/pecans/pistachios 1/4c), hummus, beef/deer jerky or meat sticks; vegetable, fruit, dairy-milk(1%, skim, almond, soy)/cheese (not a lot of  cheddar)/yogurt (Greek is best-my favorite Dannon Fruit on the Bottom Greek)/cottage cheese 2%; triscuits/ popcorn/wheat thins have a lot of fiber; follow serving size on bag/box/container  increase water  Limit alcohol to 1 drink per day for women and 2 drinks per day for men (1 drink=12oz beer or 5oz wine or 1 1/2oz liquor)  Calcium: 500mg 1 twice a day if age 50 and younger and 600mg 1 twice a day if over age 50 (calcium citrate can be taken without food)  Vitamin D: 800-5000 IU/day  Limit salt to <2300mg a day if age 50 and under and <1500mg a day if over age 50/have high bp or diabetes or kidney disease  Recommend folate for childbearing age women 0.4mg per day (can be found in a multivitamin)  Recommend 18mg/dL of iron a day if age 50 and under and 8mg/dL a day if over age 50; take on an empty stomach at bedtime  Use sunscreen   Wear seatbelt  Recommend safe sex practices: using condoms everytime you have sex, discuss with a new partner about their past partners/history of STDs/drug use, avoid drinking alcohol or using drugs as this increases the chance that you will participate in high-risk sex, for oral sex help protect your mouth by having your partner use a condom (male or female), women should not douche after sex, be aware of your partner's body and your body-look for signs of a sore, blister, rash, or discharge, and have regular exams and periodic tests for STDs.  No distracted driving  No driving when under influence of substances  Wear a seatbelt  Eye dr every 1-2yrs  Dentist every 6-12 mon  Tetanus shot every 10yrs  Recommend flu vaccine in the fall  Appt in 1 year for physical      I will communicate with you via [employer] regarding messages and results. If you need help with this, you can call the support line at [phone].    IT WAS A PLEASURE TO SEE YOU TODAY. THANK YOU FOR CHOOSING US FOR YOUR HEALTHCARE NEEDS.

## 2024-09-27 ENCOUNTER — APPOINTMENT (OUTPATIENT)
Dept: PRIMARY CARE | Facility: CLINIC | Age: 44
End: 2024-09-27
Payer: COMMERCIAL

## 2024-09-27 LAB — NON-UH HIE VIT D 25: 26 NG/ML

## 2024-09-28 DIAGNOSIS — E55.9 VITAMIN D INSUFFICIENCY: Primary | ICD-10-CM

## 2024-09-28 RX ORDER — CHOLECALCIFEROL (VITAMIN D3) 50 MCG
50 TABLET ORAL DAILY
COMMUNITY

## 2024-12-05 ENCOUNTER — OFFICE VISIT (OUTPATIENT)
Dept: PRIMARY CARE | Facility: CLINIC | Age: 44
End: 2024-12-05
Payer: COMMERCIAL

## 2024-12-05 VITALS
HEIGHT: 64 IN | WEIGHT: 127.4 LBS | BODY MASS INDEX: 21.75 KG/M2 | SYSTOLIC BLOOD PRESSURE: 107 MMHG | TEMPERATURE: 96.9 F | HEART RATE: 75 BPM | OXYGEN SATURATION: 98 % | DIASTOLIC BLOOD PRESSURE: 61 MMHG

## 2024-12-05 DIAGNOSIS — G89.29 CHRONIC RIGHT SHOULDER PAIN: ICD-10-CM

## 2024-12-05 DIAGNOSIS — E04.2 MULTIPLE THYROID NODULES: Primary | ICD-10-CM

## 2024-12-05 DIAGNOSIS — M50.30 DDD (DEGENERATIVE DISC DISEASE), CERVICAL: ICD-10-CM

## 2024-12-05 DIAGNOSIS — M25.511 CHRONIC RIGHT SHOULDER PAIN: ICD-10-CM

## 2024-12-05 DIAGNOSIS — R20.0 NUMBNESS AND TINGLING OF RIGHT THUMB: ICD-10-CM

## 2024-12-05 DIAGNOSIS — M54.12 CERVICAL RADICULOPATHY: ICD-10-CM

## 2024-12-05 DIAGNOSIS — E55.9 VITAMIN D DEFICIENCY: Primary | ICD-10-CM

## 2024-12-05 DIAGNOSIS — M54.2 NECK PAIN: ICD-10-CM

## 2024-12-05 DIAGNOSIS — R20.2 NUMBNESS AND TINGLING OF RIGHT THUMB: ICD-10-CM

## 2024-12-05 LAB — NON-UH HIE VIT D 25: 27 NG/ML

## 2024-12-05 PROCEDURE — 3008F BODY MASS INDEX DOCD: CPT | Performed by: NURSE PRACTITIONER

## 2024-12-05 PROCEDURE — 1036F TOBACCO NON-USER: CPT | Performed by: NURSE PRACTITIONER

## 2024-12-05 PROCEDURE — 99214 OFFICE O/P EST MOD 30 MIN: CPT | Performed by: NURSE PRACTITIONER

## 2024-12-05 RX ORDER — CHOLECALCIFEROL (VITAMIN D3) 50 MCG
50 TABLET ORAL 2 TIMES DAILY
Start: 2024-12-05

## 2024-12-05 RX ORDER — ORPHENADRINE CITRATE 100 MG/1
100 TABLET, EXTENDED RELEASE ORAL 2 TIMES DAILY PRN
Qty: 60 TABLET | Refills: 1 | Status: SHIPPED | OUTPATIENT
Start: 2024-12-05 | End: 2025-06-03

## 2024-12-05 ASSESSMENT — ENCOUNTER SYMPTOMS
WHEEZING: 0
CONSTITUTIONAL NEGATIVE: 1
SHORTNESS OF BREATH: 0

## 2024-12-05 NOTE — PATIENT INSTRUCTIONS
See therapist  Continue to take buspar as needed for anxiety  Set up colonoscopy    Vit d lab today    Mri neck  Nerve conduction test  Muscle relaxant  Watch for drowsiness  Physical therapy    Ultrasound thyroid    Keep sept appt      I will communicate with you via [employer] regarding messages and results. If you need help with this, you can call the support line at [phone].    IT WAS A PLEASURE TO SEE YOU TODAY. THANK YOU FOR CHOOSING US FOR YOUR HEALTHCARE NEEDS.

## 2024-12-05 NOTE — PROGRESS NOTES
Subjective   Patient ID: Kaylah Parham is a 44 y.o. female who presents for Shoulder Pain.  Last physical: 9/26/24; has next cpe scheduled  Last labs-9/2024   Sugar high at 105. Goal <100. Last time it was 89. Decr carbs and sugars.  Make an appt with me to discuss how to lower this.  kidney function, liver function and electrolytes in the CMP (comprehensive metabolic panel) were normal.  Trigs and hdl normal.  Ldl high at 125. Goal <100. Decr fats and incr fiber.  TSH (thyroid test) was normal.  CBC (complete blood count) was normal which looks at infection and anemia markers.  Vitamin D is low. Goal is >30. Continue multivitamin. Start otc Vitamin D3 2000iu (50mcg) 1 a day. Recheck lab in 2 months. No fasting needed.  This will be a lifetime medication.  Due for vit d lab if taking the otc vit d    Is pt taking vit d otc 1 a day consistently?  Yes as best as she can   Did pt see a therapist? Not yet   Is the buspar helping her anxiety? Have not taking it a lot   Did pt set up a colon appt? No   Which shoulder is hurting?right shoulder   When did it start hurting? 1 year ago   Where on the shoulder is it hurting? It is up by her neck and radiates down, sometimes thumb is numb from it. Had xrays before and they said her c spine was degenerative. Needs mri order from pcp    Any fall or injury? No   Any other questions or concerns that pt wants to discuss today?  No       HPI  Due for vit d lab-print    Rt shoulder pain x 1yr; radiates to upper arm; pain more constant now  On off rt thumb numbness; worse after gardening the whole weekend  Neck pain on off  Xray cervical spine in past showed ddd- [facility]  Started after using a screwdriver a lot-home improvement  no fall or injury  no new work activities or exercise routine  dominant side-right  pain worse with using long periods and if not have armrest-when it hangs and as day goes on  pain right now 1-2/10  pain at worst over the last 2wks 6-7/10  no redness, rash,  warmth, bruising or swelling  No weakness noted  no wt loss, fever, or chills  no cp, heart racing, sob, wheezing, HA, dizziness, or vision change  No h/o shoulder pain; couple times needed steroid for neck  No h/o shoulder or neck surgery  selftxt: none    Thyroid lump noted  Had ultrasound thyroid 1/2024; no suspicious notdules  Would like to recheck lump  Has endo appt in feb      No care team member to display     Review of Systems   Constitutional: Negative.    Respiratory:  Negative for shortness of breath and wheezing.    Cardiovascular:  Negative for chest pain.   Musculoskeletal:         Rt neck and shoulder pain           Objective   Visit Vitals  /61   Pulse 75   Temp 36.1 °C (96.9 °F)      BP Readings from Last 3 Encounters:   12/05/24 107/61   09/26/24 110/75   06/11/24 124/79     Wt Readings from Last 3 Encounters:   12/05/24 57.8 kg (127 lb 6.4 oz)   09/26/24 56.2 kg (124 lb)   06/11/24 55.6 kg (122 lb 9.6 oz)       Physical Exam  Constitutional:       General: She is not in acute distress.     Appearance: Normal appearance.   Musculoskeletal:      Comments: rt neck and shoulder-no pain to palpation, FROM, no redness rash or swelling  Nl strength rt arm and hand   Neurological:      Mental Status: She is alert.         Assessment/Plan   Diagnoses and all orders for this visit:  Multiple thyroid nodules  -     US thyroid; Future  Cervical radiculopathy  -     MR cervical spine wo IV contrast; Future  -     EMG & nerve conduction; Future  -     orphenadrine (Norflex) 100 mg 12 hr tablet; Take 1 tablet (100 mg) by mouth 2 times a day as needed for muscle spasms or mild pain (1 - 3). Do not crush, chew, or split.  Neck pain  -     MR cervical spine wo IV contrast; Future  -     EMG & nerve conduction; Future  -     orphenadrine (Norflex) 100 mg 12 hr tablet; Take 1 tablet (100 mg) by mouth 2 times a day as needed for muscle spasms or mild pain (1 - 3). Do not crush, chew, or split.  -     Referral  to Physical Therapy; Future  Chronic right shoulder pain  -     Referral to Physical Therapy; Future  Numbness and tingling of right thumb  -     MR cervical spine wo IV contrast; Future  -     EMG & nerve conduction; Future  -     orphenadrine (Norflex) 100 mg 12 hr tablet; Take 1 tablet (100 mg) by mouth 2 times a day as needed for muscle spasms or mild pain (1 - 3). Do not crush, chew, or split.        See patient instructions for full plan

## 2025-01-06 ENCOUNTER — APPOINTMENT (OUTPATIENT)
Dept: OBSTETRICS AND GYNECOLOGY | Facility: CLINIC | Age: 45
End: 2025-01-06
Payer: COMMERCIAL

## 2025-01-07 ENCOUNTER — HOSPITAL ENCOUNTER (OUTPATIENT)
Dept: RADIOLOGY | Facility: CLINIC | Age: 45
Discharge: HOME | End: 2025-01-07
Payer: COMMERCIAL

## 2025-01-07 DIAGNOSIS — E04.2 MULTIPLE THYROID NODULES: ICD-10-CM

## 2025-01-07 PROCEDURE — 76536 US EXAM OF HEAD AND NECK: CPT | Performed by: RADIOLOGY

## 2025-01-07 PROCEDURE — 76536 US EXAM OF HEAD AND NECK: CPT

## 2025-01-08 PROBLEM — E04.2 MULTINODULAR GOITER: Status: ACTIVE | Noted: 2025-01-08

## 2025-02-11 ENCOUNTER — APPOINTMENT (OUTPATIENT)
Dept: OBSTETRICS AND GYNECOLOGY | Facility: CLINIC | Age: 45
End: 2025-02-11
Payer: COMMERCIAL

## 2025-02-26 ENCOUNTER — APPOINTMENT (OUTPATIENT)
Dept: ENDOCRINOLOGY | Facility: CLINIC | Age: 45
End: 2025-02-26
Payer: COMMERCIAL

## 2025-02-26 VITALS
HEIGHT: 64 IN | DIASTOLIC BLOOD PRESSURE: 74 MMHG | SYSTOLIC BLOOD PRESSURE: 112 MMHG | WEIGHT: 127 LBS | BODY MASS INDEX: 21.68 KG/M2

## 2025-02-26 DIAGNOSIS — R73.01 IMPAIRED FASTING GLUCOSE: ICD-10-CM

## 2025-02-26 DIAGNOSIS — E04.2 MULTIPLE THYROID NODULES: Primary | ICD-10-CM

## 2025-02-26 DIAGNOSIS — E78.5 HYPERLIPIDEMIA, UNSPECIFIED HYPERLIPIDEMIA TYPE: ICD-10-CM

## 2025-02-26 PROCEDURE — 99204 OFFICE O/P NEW MOD 45 MIN: CPT | Performed by: INTERNAL MEDICINE

## 2025-02-26 PROCEDURE — 3008F BODY MASS INDEX DOCD: CPT | Performed by: INTERNAL MEDICINE

## 2025-02-26 NOTE — PROGRESS NOTES
Patient ID: Kaylah Parham is a 45 y.o. female who presents for New Patient Visit (Endocrine consult.Referred by Neena Toledo for thyroid nodules.).  HPI  The patient is referred for evaluation of multinodular thyroid.    This is a 45-year-old female who initially noted a lump on the right side of her neck in 2023.    She had an ultrasound January 5, 2024 which revealed a right lobe that was 4.3 x 1.0 x 1.5 cm with numerous nonsuspicious nodules largest measuring 1.3 cm x 0.8 x 0.8 cm and a left lobe that was 4.7 x 0.8 x 1.5 cm with multiple subcentimeter nonsuspicious nodules and an isthmus that was 0.4 cm with a nonsuspicious 1.3 cm nodule.    She had a repeat ultrasound 1 month ago right lobe was 1.6 x 1.1 x 5.3 cm with a 1.1 x 0.7 x 0.6 cm cystic and solid hypoechoic TI-RADS 3 nodule.  Left lobe 1.6 x 1.0 x 4.5 cm with several small colloid cysts and an isthmus that had a 1.5 x 1.1 x 0.8 cm colloid cyst.    She thinks at times the nodule has felt larger.    She does complain of fatigue and cold intolerance.    She had thyroid function that was normal in September.    She has never had head or neck irradiation.    She has a past history of impaired fasting glucose and hyperlipidemia.    Socially she is  works as a  and has trained as a nurse non-smoker nondrinker.    Family history negative diabetes or thyroid.    ROS  Comprehensive review of systems is negative.    Objective   Physical Exam  Visit Vitals  /74      Vitals:    02/26/25 1011   Weight: 57.6 kg (127 lb)      Body mass index is 21.8 kg/m².      Alert and oriented x3  In no distress  No focal neurologic deficits  No supraclavicular, or dorsal fat  No purple striae  Integument intact  Eyes normal  ENT normal. No adenopathy  Thyroid palpable more prominent right side  Chest clear to auscultation  Heart sounds are normal  Abdomen nontender. Bowel sounds normal. No organomegaly  Feet are okay  Reflexes normal with normal  return    Current Outpatient Medications   Medication Sig Dispense Refill    busPIRone (Buspar) 5 mg tablet Take 0.5-1 tablets (2.5-5 mg) by mouth 3 times a day as needed (anxiety). 90 tablet 5    cholecalciferol (Vitamin D3) 50 MCG (2000 UT) tablet Take 1 tablet (50 mcg) by mouth 2 times a day.      multivitamin tablet Take 1 tablet by mouth once daily.      orphenadrine (Norflex) 100 mg 12 hr tablet Take 1 tablet (100 mg) by mouth 2 times a day as needed for muscle spasms or mild pain (1 - 3). Do not crush, chew, or split. (Patient not taking: Reported on 2/26/2025) 60 tablet 1     No current facility-administered medications for this visit.       Assessment/Plan     1.  Multinodular thyroid with no concerning features  2.  Impaired fasting glucose  3.  Hyperlipidemia    We reviewed her thyroid ultrasounds.    We discussed guidelines.    Given the TI-RADS 3 being less than 1.5 cm no follow-up is warranted.    We discussed thyroid function.    We discussed thyroid symptoms as well as the nonspecificity.    We discussed her blood sugar and cholesterol.    She will follow-up with her primary care doctor with these tests.    We discussed that if there is a change in the nodule per her perception would repeat an ultrasound but based on guidelines at this point no further evaluation or imaging is warranted.    She will follow-up with me if needed.

## 2025-04-03 ENCOUNTER — APPOINTMENT (OUTPATIENT)
Dept: OPHTHALMOLOGY | Facility: CLINIC | Age: 45
End: 2025-04-03
Payer: COMMERCIAL

## 2025-04-08 ENCOUNTER — APPOINTMENT (OUTPATIENT)
Dept: OBSTETRICS AND GYNECOLOGY | Facility: CLINIC | Age: 45
End: 2025-04-08
Payer: COMMERCIAL

## 2025-04-08 VITALS
DIASTOLIC BLOOD PRESSURE: 68 MMHG | BODY MASS INDEX: 21.17 KG/M2 | WEIGHT: 124 LBS | SYSTOLIC BLOOD PRESSURE: 112 MMHG | HEIGHT: 64 IN

## 2025-04-08 DIAGNOSIS — R22.32 MASS OF LEFT AXILLA: ICD-10-CM

## 2025-04-08 DIAGNOSIS — Z12.11 COLON CANCER SCREENING: Primary | ICD-10-CM

## 2025-04-08 DIAGNOSIS — Z01.419 WELL WOMAN EXAM: ICD-10-CM

## 2025-04-08 PROCEDURE — 1036F TOBACCO NON-USER: CPT | Performed by: OBSTETRICS & GYNECOLOGY

## 2025-04-08 PROCEDURE — 99396 PREV VISIT EST AGE 40-64: CPT | Performed by: OBSTETRICS & GYNECOLOGY

## 2025-04-08 PROCEDURE — 3008F BODY MASS INDEX DOCD: CPT | Performed by: OBSTETRICS & GYNECOLOGY

## 2025-04-08 ASSESSMENT — PAIN SCALES - GENERAL: PAINLEVEL_OUTOF10: 0-NO PAIN

## 2025-04-08 NOTE — PROGRESS NOTES
"Kaylah Parham is a 45 y.o. female who is here for a routine exam. PCP = Neena Toledo, APRN-CNP    Menses : monthly  Contraception : other  HPV vaccine : No  Last pap : 2024 normal  Last HPV : 2023 negative  History of abnormal pap : No  Last mammogram : 2024 normal  History of abnormal mammogram : Yes, describe: Left breast calcifications  Colon cancer screen : never    ROS  systems reviewed, anything negative noted in HPI    bladder: no dysuria, gross hematuria, urinary frequency, urgency or incontinence  breast: no lumps, nipple d/c, overlying skin changes, redness, or skin retraction    [unfilled]    Past med hx and past surg hx reviewed and notable for:     Objective   /68   Ht 1.626 m (5' 4\")   Wt 56.2 kg (124 lb)   LMP 03/31/2025   BMI 21.28 kg/m²      General:   Alert and oriented, in no acute distress   Neck: Supple. No visible thyromegaly.    Breast/Axilla: Normal to palpation bilaterally without masses, skin changes, lymphadenopathy, or nipple discharge.    Abdomen: Soft, non-tender, without masses or organomegaly   Vulva:  Urethra: Normal architecture without erythema, masses, or lesions.   Normal    Vagina: Normal mucosa without lesions, masses, or atrophy. No abnormal vaginal discharge.    Cervix: Normal without masses, lesions, or signs of cervicitis.    Uterus: Normal mobile, non-enlarged uterus    Adnexa: Normal without masses or lesions   Pelvic Floor: No POP noted.    Psych:  Anus/Perineum: Normal affect. Normal mood.   Normal without masses or lesions      Thank you for coming to your annual exam. Your findings during the exam were normal. Specific topics addressed during this exam included: healthy lifestyle, well woman screening guidelines,     Actions performed during this visit include:  - Clinical breast exam  - Clinical pelvic exam  - pap  - Mammogram up to date    Orders Placed This Encounter   Procedures    BI US breast limited left    Cologuard® colon cancer screening     "       Please return for your next visit in 1 year.

## 2025-04-14 DIAGNOSIS — R22.32 MASS OF LEFT AXILLA: Primary | ICD-10-CM

## 2025-04-25 LAB — NONINV COLON CA DNA+OCC BLD SCRN STL QL: NEGATIVE

## 2025-05-13 ENCOUNTER — APPOINTMENT (OUTPATIENT)
Dept: OPHTHALMOLOGY | Facility: CLINIC | Age: 45
End: 2025-05-13
Payer: COMMERCIAL

## 2025-05-13 DIAGNOSIS — H52.4 MYOPIA OF BOTH EYES WITH ASTIGMATISM AND PRESBYOPIA: Primary | ICD-10-CM

## 2025-05-13 DIAGNOSIS — H43.89 VITREO-RETINAL ADHESIONS: ICD-10-CM

## 2025-05-13 DIAGNOSIS — H52.13 MYOPIA OF BOTH EYES WITH ASTIGMATISM AND PRESBYOPIA: Primary | ICD-10-CM

## 2025-05-13 DIAGNOSIS — H52.203 MYOPIA OF BOTH EYES WITH ASTIGMATISM AND PRESBYOPIA: Primary | ICD-10-CM

## 2025-05-13 PROCEDURE — 92014 COMPRE OPH EXAM EST PT 1/>: CPT | Performed by: OPTOMETRIST

## 2025-05-13 PROCEDURE — 92310 CONTACT LENS FITTING OU: CPT | Performed by: OPTOMETRIST

## 2025-05-13 PROCEDURE — 92015 DETERMINE REFRACTIVE STATE: CPT | Performed by: OPTOMETRIST

## 2025-05-13 ASSESSMENT — REFRACTION_CURRENTRX
OD_DIAMETER: 14.2
OS_DIAMETER: 14.2
OD_BASECURVE: 8.3
OD_SPHERE: -5.25
OD_BASECURVE: 8.3
OD_BRAND: PRECISION 1
OD_BASECURVE: 8.6
OD_BRAND: BIOTRUE 1 DAY MULTIFOCAL
OS_DIAMETER: 14.2
OS_DIAMETER: 14.5
OD_DIAMETER: 14.2
OD_SPHERE: -5.25
OS_SPHERE: -4.50
OS_BRAND: BIOTRUE 1 DAY MULTIFOCAL
OS_SPHERE: -4.00
OS_SPHERE: -4.00
OD_BRAND: PRECISION 1
OD_DIAMETER: 14.5
OD_ADD: LOW
OS_BASECURVE: 8.3
OS_ADD: LOW
OS_BRAND: PRECISION 1
OS_BRAND: PRECISION 1
OS_BASECURVE: 8.3
OS_BASECURVE: 8.6
OD_SPHERE: -5.25

## 2025-05-13 ASSESSMENT — CONF VISUAL FIELD
OD_INFERIOR_TEMPORAL_RESTRICTION: 0
OD_SUPERIOR_TEMPORAL_RESTRICTION: 0
OD_INFERIOR_NASAL_RESTRICTION: 0
OD_NORMAL: 1
OS_SUPERIOR_TEMPORAL_RESTRICTION: 0
OS_INFERIOR_TEMPORAL_RESTRICTION: 0
OS_INFERIOR_NASAL_RESTRICTION: 0
OS_NORMAL: 1
OS_SUPERIOR_NASAL_RESTRICTION: 0
OD_SUPERIOR_NASAL_RESTRICTION: 0

## 2025-05-13 ASSESSMENT — ENCOUNTER SYMPTOMS
GASTROINTESTINAL NEGATIVE: 0
ENDOCRINE NEGATIVE: 0
RESPIRATORY NEGATIVE: 0
CARDIOVASCULAR NEGATIVE: 0
ALLERGIC/IMMUNOLOGIC NEGATIVE: 0
CONSTITUTIONAL NEGATIVE: 0
EYES NEGATIVE: 1
NEUROLOGICAL NEGATIVE: 0
HEMATOLOGIC/LYMPHATIC NEGATIVE: 0
MUSCULOSKELETAL NEGATIVE: 0
PSYCHIATRIC NEGATIVE: 0

## 2025-05-13 ASSESSMENT — VISUAL ACUITY
VA_OR_OS_CURRENT_RX: 20/30-2
VA_OR_OD_CURRENT_RX: 20/20
VA_OR_OD_CURRENT_RX: 20/20
OD_CC: 20/20
OS_CC: J1
VA_OR_OS_CURRENT_RX: 20/30-2
METHOD: SNELLEN - LINEAR
VA_OR_OD_CURRENT_RX: 20/20
CORRECTION_TYPE: CONTACTS
OD_CC: J1
VA_OR_OS_CURRENT_RX: 20/30-2
OS_CC: 20/30-2

## 2025-05-13 ASSESSMENT — TONOMETRY
OS_IOP_MMHG: 16
IOP_METHOD: GOLDMANN APPLANATION
OD_IOP_MMHG: 16

## 2025-05-13 ASSESSMENT — REFRACTION_MANIFEST
OD_SPHERE: -6.00
OS_CYLINDER: -0.50
OD_CYLINDER: -0.25
OS_ADD: +1.25
OD_ADD: +1.25
OS_AXIS: 175
OD_AXIS: 180
OS_SPHERE: -4.25

## 2025-05-13 ASSESSMENT — CUP TO DISC RATIO
OS_RATIO: .25
OD_RATIO: .15

## 2025-05-13 ASSESSMENT — REFRACTION_WEARINGRX
OD_SPHERE: -5.75
OS_AXIS: 175
OD_AXIS: 160
OS_SPHERE: -4.25
OS_CYLINDER: -0.50
OD_CYLINDER: -0.50

## 2025-05-13 ASSESSMENT — SLIT LAMP EXAM - LIDS
COMMENTS: GOOD POSITION
COMMENTS: GOOD POSITION

## 2025-05-13 ASSESSMENT — EXTERNAL EXAM - RIGHT EYE: OD_EXAM: NORMAL

## 2025-05-13 ASSESSMENT — EXTERNAL EXAM - LEFT EYE: OS_EXAM: NORMAL

## 2025-05-13 NOTE — PROGRESS NOTES
A spectacle prescription was dispensed to be used as needed.     A contact lens prescription was dispensed at the patient`s request. Reduced OS from -4.50 to -4.00. OS has mild amblyopia.   One Rx for Biotrue presbyopia as well.      Gets SCHs and frequency has gone down. Not on blood thinners. Has allergies and may be rubbing eyes. Small pingueculae. Will monitor.     VA 20/20 20/30-20/40 OD/OS. Longstanding reduced OS and due to amblyopia. Small eso-posture at near but aligns easily.     Small VR tuft at 2 OD and The patient was asked to return to our clinic or seek out eye care ASAP if new flashes of light or floaters are noted.      Small pigment spot (beartrack) at superior arcade vessel OS. Benign. Will monitor.      The patient was asked to return to our clinic in one year or sooner if ocular or vision changes occurs.

## 2025-09-29 ENCOUNTER — APPOINTMENT (OUTPATIENT)
Dept: PRIMARY CARE | Facility: CLINIC | Age: 45
End: 2025-09-29
Payer: COMMERCIAL

## 2025-11-06 ENCOUNTER — APPOINTMENT (OUTPATIENT)
Dept: OPHTHALMOLOGY | Facility: CLINIC | Age: 45
End: 2025-11-06
Payer: COMMERCIAL

## 2026-05-20 ENCOUNTER — APPOINTMENT (OUTPATIENT)
Dept: OPHTHALMOLOGY | Facility: CLINIC | Age: 46
End: 2026-05-20
Payer: COMMERCIAL